# Patient Record
Sex: FEMALE | Race: WHITE | NOT HISPANIC OR LATINO | Employment: UNEMPLOYED | URBAN - METROPOLITAN AREA
[De-identification: names, ages, dates, MRNs, and addresses within clinical notes are randomized per-mention and may not be internally consistent; named-entity substitution may affect disease eponyms.]

---

## 2019-03-27 ENCOUNTER — TELEPHONE (OUTPATIENT)
Dept: RHEUMATOLOGY | Facility: CLINIC | Age: 49
End: 2019-03-27
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
## 2019-10-30 ENCOUNTER — TELEPHONE (OUTPATIENT)
Dept: BARIATRICS | Facility: CLINIC | Age: 49
End: 2019-10-30

## 2019-10-30 NOTE — TELEPHONE ENCOUNTER
Patient stated she would like to see Dr Adan. Was able to schedule her and told her I will mail her out info Address: [address] [place] [address], [place] [address]

## 2019-10-30 NOTE — TELEPHONE ENCOUNTER
----- Message from Maryjoselito Bardales sent at 10/30/2019 12:14 PM CDT -----  Contact: Bola Pts    Needs Medical Advice    Who Called: Bola Scott pt's      Symptoms (please be specific): Bola states that he is calling on behalf of patient to discuss possible revision of previous WLS pt had done w/ Dr. Pandey in 2005/2006. States that pt has gained some weight back and she would like to know how to move forward with having WLS again. Please contact pt/pt's  to advise.    Best Call Back Number: [phone]

## 2019-10-30 NOTE — TELEPHONE ENCOUNTER
----- Message from Maryjoselito Bardales sent at 10/30/2019 12:14 PM CDT -----  Contact: Bola Pts    Needs Medical Advice    Who Called: Bola Scott pt's      Symptoms (please be specific): Bola states that he is calling on behalf of patient to discuss possible revision of previous WLS pt had done w/ Dr. Pandey in 2005/2006. States that pt has gained some weight back and she would like to know how to move forward with having WLS again. Please contact pt/pt's  to advise.    Best Call Back Number: [phone]

## 2020-02-06 ENCOUNTER — HISTORICAL (OUTPATIENT)
Dept: ADMINISTRATIVE | Facility: HOSPITAL | Age: 50
End: 2020-02-06

## 2020-02-07 LAB
LAB AP CLINICAL INFORMATION: NORMAL
LAB AP DIAGNOSIS - HISTORICAL: NORMAL
LAB AP GROSS PATHOLOGY - HISTORICAL: NORMAL
LAB AP SPECIMEN SUBMITTED - HISTORICAL: NORMAL

## 2020-03-05 ENCOUNTER — HISTORICAL (OUTPATIENT)
Dept: ADMINISTRATIVE | Facility: HOSPITAL | Age: 50
End: 2020-03-05

## 2020-03-06 LAB

## 2021-08-04 ENCOUNTER — OFFICE VISIT (OUTPATIENT)
Dept: FAMILY MEDICINE | Facility: CLINIC | Age: 51
End: 2021-08-04
Payer: COMMERCIAL

## 2021-08-04 VITALS
RESPIRATION RATE: 18 BRPM | WEIGHT: 230 LBS | OXYGEN SATURATION: 99 % | HEART RATE: 82 BPM | TEMPERATURE: 98 F | SYSTOLIC BLOOD PRESSURE: 124 MMHG | HEIGHT: 62 IN | DIASTOLIC BLOOD PRESSURE: 80 MMHG | BODY MASS INDEX: 42.33 KG/M2

## 2021-08-04 DIAGNOSIS — L23.2 ALLERGIC CONTACT DERMATITIS DUE TO COSMETICS: Primary | ICD-10-CM

## 2021-08-04 PROBLEM — D64.9 ANEMIA: Status: ACTIVE | Noted: 2021-08-04

## 2021-08-04 PROBLEM — Z78.0 POSTMENOPAUSAL: Status: ACTIVE | Noted: 2021-08-04

## 2021-08-04 PROBLEM — E66.01 MORBID OBESITY: Status: ACTIVE | Noted: 2021-08-04

## 2021-08-04 PROBLEM — M79.7 FIBROMYOSITIS: Status: ACTIVE | Noted: 2021-08-04

## 2021-08-04 PROCEDURE — 1160F RVW MEDS BY RX/DR IN RCRD: CPT | Mod: ,,, | Performed by: NURSE PRACTITIONER

## 2021-08-04 PROCEDURE — 1159F PR MEDICATION LIST DOCUMENTED IN MEDICAL RECORD: ICD-10-PCS | Mod: ,,, | Performed by: NURSE PRACTITIONER

## 2021-08-04 PROCEDURE — 99203 PR OFFICE/OUTPT VISIT, NEW, LEVL III, 30-44 MIN: ICD-10-PCS | Mod: 25,,, | Performed by: NURSE PRACTITIONER

## 2021-08-04 PROCEDURE — 96372 THER/PROPH/DIAG INJ SC/IM: CPT | Mod: ,,, | Performed by: NURSE PRACTITIONER

## 2021-08-04 PROCEDURE — 1160F PR REVIEW ALL MEDS BY PRESCRIBER/CLIN PHARMACIST DOCUMENTED: ICD-10-PCS | Mod: ,,, | Performed by: NURSE PRACTITIONER

## 2021-08-04 PROCEDURE — 3008F BODY MASS INDEX DOCD: CPT | Mod: ,,, | Performed by: NURSE PRACTITIONER

## 2021-08-04 PROCEDURE — 3079F PR MOST RECENT DIASTOLIC BLOOD PRESSURE 80-89 MM HG: ICD-10-PCS | Mod: ,,, | Performed by: NURSE PRACTITIONER

## 2021-08-04 PROCEDURE — 3079F DIAST BP 80-89 MM HG: CPT | Mod: ,,, | Performed by: NURSE PRACTITIONER

## 2021-08-04 PROCEDURE — 1159F MED LIST DOCD IN RCRD: CPT | Mod: ,,, | Performed by: NURSE PRACTITIONER

## 2021-08-04 PROCEDURE — 3074F SYST BP LT 130 MM HG: CPT | Mod: ,,, | Performed by: NURSE PRACTITIONER

## 2021-08-04 PROCEDURE — 3074F PR MOST RECENT SYSTOLIC BLOOD PRESSURE < 130 MM HG: ICD-10-PCS | Mod: ,,, | Performed by: NURSE PRACTITIONER

## 2021-08-04 PROCEDURE — 96372 PR INJECTION,THERAP/PROPH/DIAG2ST, IM OR SUBCUT: ICD-10-PCS | Mod: ,,, | Performed by: NURSE PRACTITIONER

## 2021-08-04 PROCEDURE — 3008F PR BODY MASS INDEX (BMI) DOCUMENTED: ICD-10-PCS | Mod: ,,, | Performed by: NURSE PRACTITIONER

## 2021-08-04 PROCEDURE — 99203 OFFICE O/P NEW LOW 30 MIN: CPT | Mod: 25,,, | Performed by: NURSE PRACTITIONER

## 2021-08-04 RX ORDER — METHYLPREDNISOLONE ACETATE 40 MG/ML
40 INJECTION, SUSPENSION INTRA-ARTICULAR; INTRALESIONAL; INTRAMUSCULAR; SOFT TISSUE ONCE
Status: COMPLETED | OUTPATIENT
Start: 2021-08-04 | End: 2021-08-04

## 2021-08-04 RX ORDER — DEXAMETHASONE SODIUM PHOSPHATE 4 MG/ML
4 INJECTION, SOLUTION INTRA-ARTICULAR; INTRALESIONAL; INTRAMUSCULAR; INTRAVENOUS; SOFT TISSUE ONCE
Status: COMPLETED | OUTPATIENT
Start: 2021-08-04 | End: 2021-08-04

## 2021-08-04 RX ADMIN — METHYLPREDNISOLONE ACETATE 40 MG: 40 INJECTION, SUSPENSION INTRA-ARTICULAR; INTRALESIONAL; INTRAMUSCULAR; SOFT TISSUE at 10:08

## 2021-08-04 RX ADMIN — DEXAMETHASONE SODIUM PHOSPHATE 4 MG: 4 INJECTION, SOLUTION INTRA-ARTICULAR; INTRALESIONAL; INTRAMUSCULAR; INTRAVENOUS; SOFT TISSUE at 10:08

## 2021-10-22 ENCOUNTER — OFFICE VISIT (OUTPATIENT)
Dept: FAMILY MEDICINE | Facility: CLINIC | Age: 51
End: 2021-10-22
Payer: COMMERCIAL

## 2021-10-22 VITALS
RESPIRATION RATE: 17 BRPM | HEART RATE: 91 BPM | HEIGHT: 62 IN | WEIGHT: 227 LBS | TEMPERATURE: 99 F | BODY MASS INDEX: 41.77 KG/M2 | OXYGEN SATURATION: 99 %

## 2021-10-22 DIAGNOSIS — R51.9 ACUTE NONINTRACTABLE HEADACHE, UNSPECIFIED HEADACHE TYPE: ICD-10-CM

## 2021-10-22 DIAGNOSIS — J02.9 SORE THROAT: ICD-10-CM

## 2021-10-22 DIAGNOSIS — R05.9 COUGH: ICD-10-CM

## 2021-10-22 DIAGNOSIS — J01.11 ACUTE RECURRENT FRONTAL SINUSITIS: Primary | ICD-10-CM

## 2021-10-22 LAB
CTP QC/QA: YES
CTP QC/QA: YES
FLUAV AG NPH QL: NEGATIVE
FLUBV AG NPH QL: NEGATIVE
S PYO RRNA THROAT QL PROBE: NEGATIVE
SARS-COV-2 AG RESP QL IA.RAPID: NEGATIVE

## 2021-10-22 PROCEDURE — 1159F MED LIST DOCD IN RCRD: CPT | Mod: ,,, | Performed by: NURSE PRACTITIONER

## 2021-10-22 PROCEDURE — 87880 POCT RAPID STREP A: ICD-10-PCS | Mod: QW,,, | Performed by: NURSE PRACTITIONER

## 2021-10-22 PROCEDURE — 3008F PR BODY MASS INDEX (BMI) DOCUMENTED: ICD-10-PCS | Mod: ,,, | Performed by: NURSE PRACTITIONER

## 2021-10-22 PROCEDURE — 1160F RVW MEDS BY RX/DR IN RCRD: CPT | Mod: ,,, | Performed by: NURSE PRACTITIONER

## 2021-10-22 PROCEDURE — 1159F PR MEDICATION LIST DOCUMENTED IN MEDICAL RECORD: ICD-10-PCS | Mod: ,,, | Performed by: NURSE PRACTITIONER

## 2021-10-22 PROCEDURE — 99214 PR OFFICE/OUTPT VISIT, EST, LEVL IV, 30-39 MIN: ICD-10-PCS | Mod: ,,, | Performed by: NURSE PRACTITIONER

## 2021-10-22 PROCEDURE — 99214 OFFICE O/P EST MOD 30 MIN: CPT | Mod: ,,, | Performed by: NURSE PRACTITIONER

## 2021-10-22 PROCEDURE — 87428 SARSCOV & INF VIR A&B AG IA: CPT | Mod: QW,,, | Performed by: NURSE PRACTITIONER

## 2021-10-22 PROCEDURE — 87880 STREP A ASSAY W/OPTIC: CPT | Mod: QW,,, | Performed by: NURSE PRACTITIONER

## 2021-10-22 PROCEDURE — 1160F PR REVIEW ALL MEDS BY PRESCRIBER/CLIN PHARMACIST DOCUMENTED: ICD-10-PCS | Mod: ,,, | Performed by: NURSE PRACTITIONER

## 2021-10-22 PROCEDURE — 87428 POCT SARS-COV2 (COVID) WITH FLU ANTIGEN: ICD-10-PCS | Mod: QW,,, | Performed by: NURSE PRACTITIONER

## 2021-10-22 PROCEDURE — 3008F BODY MASS INDEX DOCD: CPT | Mod: ,,, | Performed by: NURSE PRACTITIONER

## 2021-10-22 RX ORDER — GUAIFENESIN 1200 MG/1
1 TABLET, EXTENDED RELEASE ORAL 2 TIMES DAILY
Qty: 20 TABLET | Refills: 0 | Status: SHIPPED | OUTPATIENT
Start: 2021-10-22 | End: 2021-11-01

## 2021-10-22 RX ORDER — METHYLPREDNISOLONE 4 MG/1
TABLET ORAL
Qty: 21 TABLET | Refills: 0 | Status: SHIPPED | OUTPATIENT
Start: 2021-10-22 | End: 2022-04-08

## 2022-04-08 ENCOUNTER — OFFICE VISIT (OUTPATIENT)
Dept: FAMILY MEDICINE | Facility: CLINIC | Age: 52
End: 2022-04-08
Payer: COMMERCIAL

## 2022-04-08 VITALS
TEMPERATURE: 99 F | SYSTOLIC BLOOD PRESSURE: 112 MMHG | RESPIRATION RATE: 18 BRPM | HEART RATE: 92 BPM | BODY MASS INDEX: 41.52 KG/M2 | DIASTOLIC BLOOD PRESSURE: 82 MMHG | OXYGEN SATURATION: 95 % | WEIGHT: 227 LBS

## 2022-04-08 DIAGNOSIS — H65.92 MIDDLE EAR EFFUSION, LEFT: Primary | ICD-10-CM

## 2022-04-08 DIAGNOSIS — R42 VERTIGO: ICD-10-CM

## 2022-04-08 PROCEDURE — 3008F PR BODY MASS INDEX (BMI) DOCUMENTED: ICD-10-PCS | Mod: ,,, | Performed by: NURSE PRACTITIONER

## 2022-04-08 PROCEDURE — 99214 PR OFFICE/OUTPT VISIT, EST, LEVL IV, 30-39 MIN: ICD-10-PCS | Mod: ,,, | Performed by: NURSE PRACTITIONER

## 2022-04-08 PROCEDURE — 1159F PR MEDICATION LIST DOCUMENTED IN MEDICAL RECORD: ICD-10-PCS | Mod: ,,, | Performed by: NURSE PRACTITIONER

## 2022-04-08 PROCEDURE — 99214 OFFICE O/P EST MOD 30 MIN: CPT | Mod: ,,, | Performed by: NURSE PRACTITIONER

## 2022-04-08 PROCEDURE — 1160F PR REVIEW ALL MEDS BY PRESCRIBER/CLIN PHARMACIST DOCUMENTED: ICD-10-PCS | Mod: ,,, | Performed by: NURSE PRACTITIONER

## 2022-04-08 PROCEDURE — 3074F PR MOST RECENT SYSTOLIC BLOOD PRESSURE < 130 MM HG: ICD-10-PCS | Mod: ,,, | Performed by: NURSE PRACTITIONER

## 2022-04-08 PROCEDURE — 3008F BODY MASS INDEX DOCD: CPT | Mod: ,,, | Performed by: NURSE PRACTITIONER

## 2022-04-08 PROCEDURE — 3079F DIAST BP 80-89 MM HG: CPT | Mod: ,,, | Performed by: NURSE PRACTITIONER

## 2022-04-08 PROCEDURE — 3079F PR MOST RECENT DIASTOLIC BLOOD PRESSURE 80-89 MM HG: ICD-10-PCS | Mod: ,,, | Performed by: NURSE PRACTITIONER

## 2022-04-08 PROCEDURE — 3074F SYST BP LT 130 MM HG: CPT | Mod: ,,, | Performed by: NURSE PRACTITIONER

## 2022-04-08 PROCEDURE — 1160F RVW MEDS BY RX/DR IN RCRD: CPT | Mod: ,,, | Performed by: NURSE PRACTITIONER

## 2022-04-08 PROCEDURE — 1159F MED LIST DOCD IN RCRD: CPT | Mod: ,,, | Performed by: NURSE PRACTITIONER

## 2022-04-08 RX ORDER — MECLIZINE HYDROCHLORIDE 25 MG/1
25 TABLET ORAL 3 TIMES DAILY PRN
Qty: 45 TABLET | Refills: 0 | Status: SHIPPED | OUTPATIENT
Start: 2022-04-08 | End: 2023-11-08

## 2022-04-08 RX ORDER — CETIRIZINE HYDROCHLORIDE, PSEUDOEPHEDRINE HYDROCHLORIDE 5; 120 MG/1; MG/1
1 TABLET, FILM COATED, EXTENDED RELEASE ORAL NIGHTLY
Qty: 10 TABLET | Refills: 0 | Status: SHIPPED | OUTPATIENT
Start: 2022-04-08 | End: 2022-04-18

## 2022-04-08 NOTE — PROGRESS NOTES
[facility]    Chief Complaint      Chief Complaint   Patient presents with    Ear Fullness     Left ear W/ pain x 1month     Nausea     X 1month     Dizziness     X 1month      History of Present Illness      Nely Scott is a 52 y.o. female with chronic conditions of fibromyalgia and obesity who presents today for c/o left ear pain and nausea intermittently x1 month. She reports that she was cleared by GI for her nausea, who recommended that she follow up with a family practice provider for further evaluation. She also c/o a cute to her finger this AM with a razor that has been bleeding persistently. She reports that it was a new razor and that her tetanus is UTD.    Ear Fullness   There is pain in the left ear. This is a recurrent problem. The current episode started 1 to 4 weeks ago. The problem occurs constantly. The problem has been unchanged. There has been no fever. The patient is experiencing no pain. Associated symptoms include hearing loss. Pertinent negatives include no abdominal pain, coughing, diarrhea, ear discharge, headaches, neck pain, rash, rhinorrhea, sore throat or vomiting. She has tried nothing for the symptoms. There is no history of a chronic ear infection, hearing loss or a tympanostomy tube.     Past Medical History:  Past Medical History:   Diagnosis Date    Anxiety     Fibromyalgia     Headache(784.0)     Loss of balance     Memory loss      Past Surgical History:   has a past surgical history that includes Dilation and curettage of uterus; Hysterectomy; TONSILLECTOMY, ADENOIDECTOMY; and spinal tap.    Social History:  Social History     Tobacco Use    Smoking status: Never Smoker    Smokeless tobacco: Never Used   Substance Use Topics    Alcohol use: No    Drug use: Never     I personally reviewed all past medical, surgical, and social.     Review of Systems   Constitutional: Negative for chills, fever and malaise/fatigue.   HENT: Positive for ear pain  and hearing loss. Negative for congestion, ear discharge, rhinorrhea, sore throat and tinnitus.    Eyes: Negative for discharge and redness.   Respiratory: Negative for cough and shortness of breath.    Cardiovascular: Negative for chest pain, palpitations and leg swelling.   Gastrointestinal: Positive for nausea. Negative for abdominal pain, diarrhea and vomiting.   Genitourinary: Negative for dysuria, frequency and urgency.   Musculoskeletal: Negative for myalgias and neck pain.   Skin: Negative for itching and rash.   Neurological: Negative for dizziness, weakness and headaches.   Endo/Heme/Allergies: Does not bruise/bleed easily.   Psychiatric/Behavioral: Negative for suicidal ideas.      Medications:  Outpatient Encounter Medications as of 4/8/2022   Medication Sig Dispense Refill    methylphenidate (RITALIN) 10 MG tablet Take 10 mg by mouth 3 (three) times daily with meals.      tramadol (ULTRAM) 50 mg tablet Take 1 tablet (50 mg total) by mouth every 6 (six) hours as needed. 120 tablet 2    cetirizine-pseudoephedrine (ZYRTEC-D) 5-120 mg Tb12 Take 1 tablet by mouth every evening. for 10 days 10 tablet 0    cyanocobalamin, vitamin B-12, 1,000 mcg Subl Place under the tongue. Patient not sure of dose, just knows she takes two capsules.      ergocalciferol (ERGOCALCIFEROL) 50,000 unit Cap Take 50,000 Units by mouth every 7 days.      meclizine (ANTIVERT) 25 mg tablet Take 1 tablet (25 mg total) by mouth 3 (three) times daily as needed for Dizziness or Nausea. 45 tablet 0    nortriptyline (PAMELOR) 10 MG capsule Take 2 capsules (20 mg total) by mouth every evening. 30 capsule 2    tizanidine 4 mg Cap Take by mouth. Take 1/4 to one tab po 4 times daily      [DISCONTINUED] gabapentin (NEURONTIN) 800 MG tablet Take 1 tablet (800 mg total) by mouth 3 (three) times daily. Take 1/2 to 1 tablet PT 4 times daily (Patient not taking: Reported on 8/4/2021) 90 tablet 2    [DISCONTINUED] methylPREDNISolone (MEDROL  DOSEPACK) 4 mg tablet use as directed (Patient not taking: Reported on 4/8/2022) 21 tablet 0     No facility-administered encounter medications on file as of 4/8/2022.     Allergies:  Review of patient's allergies indicates:   Allergen Reactions    Codeine Hives, Itching and Rash     Health Maintenance:    There is no immunization history on file for this patient.   Health Maintenance   Topic Date Due    Hepatitis C Screening  Never done    TETANUS VACCINE  Never done    Mammogram  Never done    Lipid Panel  06/30/2010      Physical Exam      Vital Signs  Temp: 98.7 °F (37.1 °C)  Pulse: 92  Resp: 18  SpO2: 95 %  BP: 112/82  Height and Weight  Weight: 103 kg (227 lb)]    Physical Exam  Vitals and nursing note reviewed.   Constitutional:       General: She is not in acute distress.     Appearance: Normal appearance. She is obese.   HENT:      Head: Normocephalic and atraumatic.      Right Ear: External ear normal. No middle ear effusion. Tympanic membrane is not injected, erythematous or bulging.      Left Ear: External ear normal. A middle ear effusion is present. Tympanic membrane is bulging. Tympanic membrane is not injected or erythematous.      Nose: Nose normal.      Mouth/Throat:      Mouth: Mucous membranes are moist.      Pharynx: Oropharynx is clear.   Eyes:      Conjunctiva/sclera: Conjunctivae normal.      Pupils: Pupils are equal, round, and reactive to light.   Cardiovascular:      Rate and Rhythm: Normal rate and regular rhythm.      Pulses: Normal pulses.      Heart sounds: Normal heart sounds. No murmur heard.  Pulmonary:      Effort: Pulmonary effort is normal. No respiratory distress.      Breath sounds: Normal breath sounds.   Musculoskeletal:         General: Normal range of motion.      Cervical back: Normal range of motion and neck supple.   Skin:     General: Skin is warm and dry.   Neurological:      General: No focal deficit present.      Mental Status: She is alert and oriented to  person, place, and time. Mental status is at baseline.   Psychiatric:         Mood and Affect: Mood normal.         Behavior: Behavior normal.         Thought Content: Thought content normal.         Judgment: Judgment normal.        Assessment/Plan     Nley Scott is a 52 y.o.female with:    1. Middle ear effusion, left  - cetirizine-pseudoephedrine (ZYRTEC-D) 5-120 mg Tb12; Take 1 tablet by mouth every evening. for 10 days  Dispense: 10 tablet; Refill: 0    2. Vertigo  - meclizine (ANTIVERT) 25 mg tablet; Take 1 tablet (25 mg total) by mouth 3 (three) times daily as needed for Dizziness or Nausea.  Dispense: 45 tablet; Refill: 0     Chronic conditions status updated as per HPI.  Other than changes above, cont current medications and maintain follow up with specialists.  Return to clinic months.    Farzaneh He, KAREYP  [facility]

## 2022-05-18 ENCOUNTER — OFFICE VISIT (OUTPATIENT)
Dept: FAMILY MEDICINE | Facility: CLINIC | Age: 52
End: 2022-05-18
Payer: COMMERCIAL

## 2022-05-18 VITALS
WEIGHT: 224 LBS | TEMPERATURE: 98 F | BODY MASS INDEX: 41.22 KG/M2 | SYSTOLIC BLOOD PRESSURE: 118 MMHG | DIASTOLIC BLOOD PRESSURE: 76 MMHG | HEIGHT: 62 IN

## 2022-05-18 DIAGNOSIS — Z20.822 CONTACT WITH AND (SUSPECTED) EXPOSURE TO COVID-19: Primary | ICD-10-CM

## 2022-05-18 LAB
CTP QC/QA: YES
FLUAV AG NPH QL: NEGATIVE
FLUBV AG NPH QL: NEGATIVE
SARS-COV-2 AG RESP QL IA.RAPID: NEGATIVE

## 2022-05-18 PROCEDURE — 1160F PR REVIEW ALL MEDS BY PRESCRIBER/CLIN PHARMACIST DOCUMENTED: ICD-10-PCS | Mod: ,,, | Performed by: NURSE PRACTITIONER

## 2022-05-18 PROCEDURE — 87428 POCT SARS-COV2 (COVID) WITH FLU ANTIGEN: ICD-10-PCS | Mod: QW,,, | Performed by: NURSE PRACTITIONER

## 2022-05-18 PROCEDURE — 1159F PR MEDICATION LIST DOCUMENTED IN MEDICAL RECORD: ICD-10-PCS | Mod: ,,, | Performed by: NURSE PRACTITIONER

## 2022-05-18 PROCEDURE — 99213 PR OFFICE/OUTPT VISIT, EST, LEVL III, 20-29 MIN: ICD-10-PCS | Mod: ,,, | Performed by: NURSE PRACTITIONER

## 2022-05-18 PROCEDURE — 3078F PR MOST RECENT DIASTOLIC BLOOD PRESSURE < 80 MM HG: ICD-10-PCS | Mod: ,,, | Performed by: NURSE PRACTITIONER

## 2022-05-18 PROCEDURE — 3074F PR MOST RECENT SYSTOLIC BLOOD PRESSURE < 130 MM HG: ICD-10-PCS | Mod: ,,, | Performed by: NURSE PRACTITIONER

## 2022-05-18 PROCEDURE — 87428 SARSCOV & INF VIR A&B AG IA: CPT | Mod: QW,,, | Performed by: NURSE PRACTITIONER

## 2022-05-18 PROCEDURE — 1160F RVW MEDS BY RX/DR IN RCRD: CPT | Mod: ,,, | Performed by: NURSE PRACTITIONER

## 2022-05-18 PROCEDURE — 3008F PR BODY MASS INDEX (BMI) DOCUMENTED: ICD-10-PCS | Mod: ,,, | Performed by: NURSE PRACTITIONER

## 2022-05-18 PROCEDURE — 3008F BODY MASS INDEX DOCD: CPT | Mod: ,,, | Performed by: NURSE PRACTITIONER

## 2022-05-18 PROCEDURE — 1159F MED LIST DOCD IN RCRD: CPT | Mod: ,,, | Performed by: NURSE PRACTITIONER

## 2022-05-18 PROCEDURE — 3074F SYST BP LT 130 MM HG: CPT | Mod: ,,, | Performed by: NURSE PRACTITIONER

## 2022-05-18 PROCEDURE — 3078F DIAST BP <80 MM HG: CPT | Mod: ,,, | Performed by: NURSE PRACTITIONER

## 2022-05-18 PROCEDURE — 99213 OFFICE O/P EST LOW 20 MIN: CPT | Mod: ,,, | Performed by: NURSE PRACTITIONER

## 2022-05-18 NOTE — LETTER
May 18, 2022    Nely Scott  [address] [place] [address]             [facility]  [address] [facility] [place] [address]  Phone: [phone]  Fax: [phone]   May 18, 2022     Patient: Nely Scott   YOB: 1970   Date of Visit: 5/18/2022       To Whom it May Concern:    Nely Scott was seen in my clinic on 5/18/2022. She may return to work on 5/24/22 and then must wear a mask for 5 additional days outside of the home.    Please excuse her from any classes or work missed.    If you have any questions or concerns, please don't hesitate to call.    Sincerely,         VIVIAN Horvath

## 2022-05-18 NOTE — PROGRESS NOTES
[facility]    Chief Complaint      Chief Complaint   Patient presents with    Chest Congestion     Feels like needs to cough something up but cant    Cough    Nasal Congestion    Sore Throat    Documentation Only     Started this morning; vac    Fatigue       History of Present Illness      Nely Scott is a 52 y.o. female with chronic conditions of Anxiety, Fibromyalgia who presents today for URI type symptoms that began this morning.  Patient reports her  was seen yesterday and tested negative for Covid and Flu but states they have had a positive family contact that tested positive for Covid on Sunday.     Past Medical History:  Past Medical History:   Diagnosis Date    Anxiety     Fibromyalgia     Headache(784.0)     Loss of balance     Memory loss        Past Surgical History:   has a past surgical history that includes Dilation and curettage of uterus; Hysterectomy; TONSILLECTOMY, ADENOIDECTOMY; and spinal tap.    Social History:  Social History     Tobacco Use    Smoking status: Never Smoker    Smokeless tobacco: Never Used   Substance Use Topics    Alcohol use: No    Drug use: Never       I personally reviewed all past medical, surgical, and social.     Review of Systems   Constitutional: Positive for fatigue. Negative for chills and fever.   HENT: Positive for congestion and sore throat. Negative for rhinorrhea and sneezing.    Respiratory: Positive for cough. Negative for shortness of breath.    Cardiovascular: Negative.    Gastrointestinal: Negative for diarrhea, nausea and vomiting.   Musculoskeletal: Negative for myalgias.   Skin: Negative for rash.   Neurological: Negative for headaches.        Medications:  Outpatient Encounter Medications as of 5/18/2022   Medication Sig Dispense Refill    cyanocobalamin, vitamin B-12, 1,000 mcg Subl Place under the tongue. Patient not sure of dose, just knows she takes two capsules.      ergocalciferol (ERGOCALCIFEROL)  "50,000 unit Cap Take 50,000 Units by mouth every 7 days.      tramadol (ULTRAM) 50 mg tablet Take 1 tablet (50 mg total) by mouth every 6 (six) hours as needed. 120 tablet 2    meclizine (ANTIVERT) 25 mg tablet Take 1 tablet (25 mg total) by mouth 3 (three) times daily as needed for Dizziness or Nausea. 45 tablet 0    methylphenidate (RITALIN) 10 MG tablet Take 10 mg by mouth 3 (three) times daily with meals.      nortriptyline (PAMELOR) 10 MG capsule Take 2 capsules (20 mg total) by mouth every evening. 30 capsule 2    tizanidine 4 mg Cap Take by mouth. Take 1/4 to one tab po 4 times daily       No facility-administered encounter medications on file as of 5/18/2022.       Allergies:  Review of patient's allergies indicates:   Allergen Reactions    Codeine Hives, Itching and Rash       Health Maintenance:    There is no immunization history on file for this patient.   Health Maintenance   Topic Date Due    Hepatitis C Screening  Never done    TETANUS VACCINE  Never done    Mammogram  Never done    Lipid Panel  06/30/2010        Physical Exam      Vital Signs  Temp: 98.2 °F (36.8 °C)  Temp src: Oral  BP: 118/76  BP Location: Left arm  Patient Position: Sitting  Height and Weight  Height: 5' 2" (157.5 cm)  Weight: 101.6 kg (224 lb)  BSA (Calculated - sq m): 2.11 sq meters  BMI (Calculated): 41  Weight in (lb) to have BMI = 25: 136.4]    Physical Exam  Vitals and nursing note reviewed.   Constitutional:       General: She is not in acute distress.     Appearance: Normal appearance. She is well-developed. She is obese.   HENT:      Head: Normocephalic.      Right Ear: Hearing normal.      Left Ear: Hearing normal.      Nose: Congestion present.   Eyes:      General: Lids are normal.      Extraocular Movements: Extraocular movements intact.      Conjunctiva/sclera: Conjunctivae normal.      Pupils: Pupils are equal, round, and reactive to light.   Cardiovascular:      Rate and Rhythm: Normal rate.   Pulmonary: "      Effort: Pulmonary effort is normal. No respiratory distress.   Musculoskeletal:         General: Normal range of motion.      Cervical back: Normal range of motion and neck supple.   Skin:     General: Skin is warm and dry.   Neurological:      Mental Status: She is alert and oriented to person, place, and time.      Gait: Gait is intact.   Psychiatric:         Behavior: Behavior is cooperative.          Laboratory:  CBC:      CMP:        Invalid input(s): CREATININ  LIPIDS:      TSH:      A1C:        Assessment/Plan     Nely Scott is a 52 y.o.female with:     1. Contact with and (suspected) exposure to covid-19  - POCT SARS-COV2 (COVID) with Flu Antigen     Patient has known exposure to Covid; Daughter also tested positive today  Advised patient to quarantine starting today with daughter per CDC recommendations  Recommended taking Zyrtec OTC and treating other symptoms with OTC meds as needed        Total time spent face-to-face and non-face-to-face coordinating care for this encounter was: 20 min    Chronic conditions status updated as per HPI.  Other than changes above, cont current medications and maintain follow up with specialists.  Return to clinic as needed.    Janki Mccann, KAREYP  [facility]

## 2022-10-12 ENCOUNTER — TELEPHONE (OUTPATIENT)
Dept: RHEUMATOLOGY | Facility: CLINIC | Age: 52
End: 2022-10-12
Payer: COMMERCIAL

## 2022-10-12 NOTE — TELEPHONE ENCOUNTER
S/w pt's  and informed him Dr. Lagos is not accepting new patients at this time and was given the phone number for our [place] office.    Julianna Razo MA  10/12/2022

## 2022-10-12 NOTE — TELEPHONE ENCOUNTER
----- Message from Joon Plaza sent at 10/12/2022 10:17 AM CDT -----  Type:  Sooner Appointment Request    Caller is requesting a sooner appointment.  Caller declined first available appointment listed below.  Caller will not accept being placed on the waitlist and is requesting a message be sent to doctor.    Name of Caller:  patient's   When is the first available appointment?  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 Lagos and they are looking to schedule her an appt.

## 2022-12-08 ENCOUNTER — OFFICE VISIT (OUTPATIENT)
Dept: RHEUMATOLOGY | Facility: CLINIC | Age: 52
End: 2022-12-08
Payer: COMMERCIAL

## 2022-12-08 VITALS
DIASTOLIC BLOOD PRESSURE: 82 MMHG | WEIGHT: 228 LBS | OXYGEN SATURATION: 96 % | SYSTOLIC BLOOD PRESSURE: 126 MMHG | HEART RATE: 76 BPM | BODY MASS INDEX: 41.7 KG/M2 | RESPIRATION RATE: 16 BRPM

## 2022-12-08 DIAGNOSIS — G25.81 RESTLESS LEG SYNDROME: Primary | ICD-10-CM

## 2022-12-08 DIAGNOSIS — Z78.0 MENOPAUSE: ICD-10-CM

## 2022-12-08 PROCEDURE — 3008F BODY MASS INDEX DOCD: CPT | Mod: ,,, | Performed by: INTERNAL MEDICINE

## 2022-12-08 PROCEDURE — 3079F DIAST BP 80-89 MM HG: CPT | Mod: ,,, | Performed by: INTERNAL MEDICINE

## 2022-12-08 PROCEDURE — 99205 PR OFFICE/OUTPT VISIT, NEW, LEVL V, 60-74 MIN: ICD-10-PCS | Mod: S$PBB,,, | Performed by: INTERNAL MEDICINE

## 2022-12-08 PROCEDURE — 99205 OFFICE O/P NEW HI 60 MIN: CPT | Mod: S$PBB,,, | Performed by: INTERNAL MEDICINE

## 2022-12-08 PROCEDURE — 99214 OFFICE O/P EST MOD 30 MIN: CPT | Mod: PBBFAC | Performed by: INTERNAL MEDICINE

## 2022-12-08 PROCEDURE — 1160F RVW MEDS BY RX/DR IN RCRD: CPT | Mod: ,,, | Performed by: INTERNAL MEDICINE

## 2022-12-08 PROCEDURE — 3079F PR MOST RECENT DIASTOLIC BLOOD PRESSURE 80-89 MM HG: ICD-10-PCS | Mod: ,,, | Performed by: INTERNAL MEDICINE

## 2022-12-08 PROCEDURE — 3074F SYST BP LT 130 MM HG: CPT | Mod: ,,, | Performed by: INTERNAL MEDICINE

## 2022-12-08 PROCEDURE — 1159F PR MEDICATION LIST DOCUMENTED IN MEDICAL RECORD: ICD-10-PCS | Mod: ,,, | Performed by: INTERNAL MEDICINE

## 2022-12-08 PROCEDURE — 1159F MED LIST DOCD IN RCRD: CPT | Mod: ,,, | Performed by: INTERNAL MEDICINE

## 2022-12-08 PROCEDURE — 3008F PR BODY MASS INDEX (BMI) DOCUMENTED: ICD-10-PCS | Mod: ,,, | Performed by: INTERNAL MEDICINE

## 2022-12-08 PROCEDURE — 1160F PR REVIEW ALL MEDS BY PRESCRIBER/CLIN PHARMACIST DOCUMENTED: ICD-10-PCS | Mod: ,,, | Performed by: INTERNAL MEDICINE

## 2022-12-08 PROCEDURE — 3074F PR MOST RECENT SYSTOLIC BLOOD PRESSURE < 130 MM HG: ICD-10-PCS | Mod: ,,, | Performed by: INTERNAL MEDICINE

## 2022-12-08 RX ORDER — DULOXETIN HYDROCHLORIDE 30 MG/1
30 CAPSULE, DELAYED RELEASE ORAL
COMMUNITY
Start: 2022-11-16 | End: 2023-11-08

## 2022-12-08 RX ORDER — ACETAMINOPHEN 500 MG
5000 TABLET ORAL
COMMUNITY

## 2022-12-08 RX ORDER — FERROUS SULFATE/VIT C/FOLIC AC 105-500-.8
TABLET, EXTENDED RELEASE ORAL
COMMUNITY
End: 2023-11-08

## 2022-12-08 RX ORDER — LEVONORGESTREL AND ETHINYL ESTRADIOL 0.15-0.03
1 KIT ORAL DAILY
Qty: 30 TABLET | Refills: 11 | Status: SHIPPED | OUTPATIENT
Start: 2022-12-08 | End: 2023-11-08

## 2022-12-08 NOTE — PROGRESS NOTES
Kelly Bucio MD   [facility]  [address] [facility] [place] [address]  [phone]      PATIENT NAME: Nely Scott  : 1970  DATE: 22  MRN: [id number]      Billing Provider: Kelly Bucio MD  Level of Service:   Patient PCP Information       Provider PCP Type    Primary Doctor No General            Reason for Visit / Chief Complaint: Arthritis (C/o arthritis and fibromyalgia/Pt states she has been seeing rheum in [place] but she has retired./C/o joint pain all over and in muscles)           Assessment and Plan (including Health Maintenance)      Problem List  Smart Sets  Document Outside HM   :    Plan:     Nely Scott is a very pleasant 52-year-old woman who is accompanied by her very supportive  today.  It appears Nely has been suffering with global fatigue and arthralgia and myofascial pain for almost 15 years.  She was very attached to her prior rheumatologist who has retired.  I believe she was diagnosed with fibromyalgia.  I had a lengthy discussion with her and in summary I suspect that at least for the last year the worsening in her overall level of energy and myofascial pain level might be related to menopause or perimenopausal state.  She is been experiencing hot flashes for the last year which disturbs her sleep and vaginal dryness.  The couple hopes for improvement in sexual function as well.  She wants to get off Cymbalta and I agree she can taper it off herself.  She has been on it only for a month now but previously has tried it on and off for many years.  I am recommending that she sees an OBGYN to get on HRT.  I am okay starting her on conjugated estrogens today.  She has had hysterectomy.  Should she start feeling better after HRT she may need nothing more than this.  However HRT should be managed by her OBGYN and I will let her make changes.  We discussed role of SSRIs and premenstrual dysmorphic  disorder and as an alternative to Cymbalta showed HRT not be effective.  I have specifically advised her to avoid bioidentical hormone pellet treatment and troch treatment.  I am not opposed to testosterone gel should she wish to improve her sexual function but she can speak with her hormonal therapist about this.  Sometimes testosterone will help with arthritis and in some women especially those with pellet interrupt therapy may actually worsen arthritis.      ICD-10-CM ICD-9-CM    1. Restless leg syndrome  G25.81 333.94 Ambulatory referral/consult to Sleep Disorders      levonorgestrel-ethinyl estradiol (SEASONALE) 0.15 mg-30 mcg (91) per tablet      2. Menopause  Z78.0 627.2 conjugated estrogens (PREMARIN) vaginal cream           Restless leg syndrome  -     Ambulatory referral/consult to Sleep Disorders; Future; Expected date: 12/15/2022  -     levonorgestrel-ethinyl estradiol (SEASONALE) 0.15 mg-30 mcg (91) per tablet; Take 1 tablet by mouth once daily.  Dispense: 30 tablet; Refill: 11    Menopause  -     conjugated estrogens (PREMARIN) vaginal cream; Place 0.5 g vaginally once daily.  Dispense: 1 applicator; Refill: 5           Total time spent in Assessment, Co-ordination of Care , Review of Care Plan , Goal Setting and Counseling on this initial visit is ~ 60 minutes     There is currently no information documented on the homunculus. Go to the Rheumatology activity and complete the homunculus joint exam.               History of Present Illness / Problem Focused Workflow     Nely Scott presents to the clinic with Arthritis (C/o arthritis and fibromyalgia/Pt states she has been seeing rheum in [place] but she has retired./C/o joint pain all over and in muscles)     Was alternating between cymbalta and lyrica with prior rheumatologist for some years.   Tramadol was also managed by prior rheumatologist.   Pain clinic locally placed her on tramadol ER which was not as effective.   Rheumatologist  had her on 200mg of tramadol which made her drowsy but she was able to extend it throughout the day and it seemed to work better.   Pain is global. Does not like cymbalta but is taking it for last month due to lack of choices. Feels like a 'xombie'   Has h/o narcolepsy dx but inadequately treated. May need to go for repeat study.         Review of Systems     Review of Systems   Constitutional:  Negative for fever and unexpected weight change.   HENT:  Negative for mouth sores and trouble swallowing.    Eyes:  Negative for redness.   Respiratory:  Positive for shortness of breath. Negative for cough.    Cardiovascular:  Positive for chest pain.   Gastrointestinal:  Positive for diarrhea. Negative for constipation.   Genitourinary:  Negative for dysuria and genital sores.   Skin:  Negative for rash.   Neurological:  Positive for headaches.   Hematological:  Bruises/bleeds easily.     Medical / Social / Family History     Past Medical History:   Diagnosis Date    Anxiety     Fibromyalgia     Headache(784.0)     Loss of balance     Memory loss        Past Surgical History:   Procedure Laterality Date    DILATION AND CURETTAGE OF UTERUS      GASTRIC BYPASS  2005    HERNIA REPAIR      HYSTERECTOMY      spinal tap      TONSILLECTOMY, ADENOIDECTOMY         Social History  Ms. Nely Scott  reports that she has never smoked. She has never been exposed to tobacco smoke. She has never used smokeless tobacco. She reports that she does not drink alcohol and does not use drugs.    Family History  Ms.'s Nely Scott family history includes Cancer in her maternal grandmother; Diabetes Mellitus in her mother; Heart disease in her maternal grandmother; Hypertension in her mother; Osteoarthritis in her maternal grandmother; Thyroid disease in her maternal grandmother.    Medications and Allergies     Medications  Outpatient Medications Marked as Taking for the 12/8/22 encounter (Office Visit) with  Kelly Bucio MD   Medication Sig Dispense Refill    cholecalciferol, vitamin D3, 125 mcg (5,000 unit) Tab Take 5,000 Units by mouth.      cyanocobalamin, vitamin B-12, 1,000 mcg Subl Place under the tongue. Patient not sure of dose, just knows she takes two capsules.      DULoxetine (CYMBALTA) 30 MG capsule Take 30 mg by mouth.      ferrous sulfate-C-folic acid (FOLITAB) 105 mg iron- 500 mg-800 mcg TbSR Take by mouth.      meclizine (ANTIVERT) 25 mg tablet Take 1 tablet (25 mg total) by mouth 3 (three) times daily as needed for Dizziness or Nausea. 45 tablet 0    multivitamin with minerals tablet Take 1 tablet by mouth once daily.      tramadol (ULTRAM) 50 mg tablet Take 1 tablet (50 mg total) by mouth every 6 (six) hours as needed. 120 tablet 2    [DISCONTINUED] ergocalciferol (ERGOCALCIFEROL) 50,000 unit Cap Take 50,000 Units by mouth every 7 days.         Allergies  Review of patient's allergies indicates:   Allergen Reactions    Codeine Hives, Itching and Rash       Physical Examination     Vitals:    12/08/22 1552   BP: 126/82   Pulse: 76   Resp: 16     Physical Exam  Constitutional:       Appearance: She is obese.   HENT:      Head: Normocephalic and atraumatic.      Right Ear: External ear normal.      Left Ear: External ear normal.      Nose: Nose normal. No congestion or rhinorrhea.   Eyes:      Extraocular Movements: Extraocular movements intact.      Pupils: Pupils are equal, round, and reactive to light.   Cardiovascular:      Pulses: Normal pulses.   Pulmonary:      Effort: Pulmonary effort is normal.      Breath sounds: Normal breath sounds. No wheezing.   Chest:      Chest wall: No tenderness.   Musculoskeletal:         General: Normal range of motion.      Cervical back: No rigidity or tenderness.   Lymphadenopathy:      Cervical: No cervical adenopathy.   Skin:     Findings: No rash.   Neurological:      General: No focal deficit present.      Mental Status: She is alert and oriented  to person, place, and time.   Psychiatric:         Mood and Affect: Mood normal.         Thought Content: Thought content normal.              Signature:  Kelly Bucio MD  [facility]  [address] [place] [id number]  [phone]    Date of encounter: 12/8/22

## 2022-12-12 DIAGNOSIS — G25.81 RESTLESS LEG SYNDROME: Primary | ICD-10-CM

## 2023-02-09 DIAGNOSIS — G47.10 HYPERSOMNIA, UNSPECIFIED: ICD-10-CM

## 2023-02-09 DIAGNOSIS — G47.30 SLEEP APNEA, UNSPECIFIED: Primary | ICD-10-CM

## 2023-02-15 ENCOUNTER — PROCEDURE VISIT (OUTPATIENT)
Dept: SLEEP MEDICINE | Facility: HOSPITAL | Age: 53
End: 2023-02-15
Attending: INTERNAL MEDICINE
Payer: COMMERCIAL

## 2023-02-15 DIAGNOSIS — G47.30 SLEEP APNEA, UNSPECIFIED: ICD-10-CM

## 2023-02-15 DIAGNOSIS — G47.10 HYPERSOMNIA, UNSPECIFIED: ICD-10-CM

## 2023-02-15 PROCEDURE — G0399 HOME SLEEP TEST/TYPE 3 PORTA: HCPCS | Mod: PO

## 2023-02-16 NOTE — PROCEDURES
Procedures  Home sleep summary included in technical report. Has been uploaded to [employer];  Recording from 4666 until 5407.     
3-point gait/postural sway, some retropulsion noted, unsteady and declined. Shuffles and NBOS

## 2023-03-27 ENCOUNTER — TELEPHONE (OUTPATIENT)
Dept: SLEEP MEDICINE | Facility: HOSPITAL | Age: 53
End: 2023-03-27
Payer: COMMERCIAL

## 2023-05-01 DIAGNOSIS — G47.30 SLEEP APNEA, UNSPECIFIED: Primary | ICD-10-CM

## 2023-05-01 DIAGNOSIS — G47.10 HYPERSOMNIA, UNSPECIFIED: ICD-10-CM

## 2023-05-10 DIAGNOSIS — G47.10 HYPERSOMNIA, UNSPECIFIED: ICD-10-CM

## 2023-05-10 DIAGNOSIS — G47.30 SLEEP APNEA, UNSPECIFIED: Primary | ICD-10-CM

## 2023-06-21 ENCOUNTER — PROCEDURE VISIT (OUTPATIENT)
Dept: SLEEP MEDICINE | Facility: HOSPITAL | Age: 53
End: 2023-06-21
Payer: COMMERCIAL

## 2023-06-21 DIAGNOSIS — G47.10 HYPERSOMNIA, UNSPECIFIED: ICD-10-CM

## 2023-06-21 DIAGNOSIS — G47.30 SLEEP APNEA, UNSPECIFIED: ICD-10-CM

## 2023-06-21 PROCEDURE — 95810 POLYSOM 6/> YRS 4/> PARAM: CPT | Mod: PO

## 2023-11-08 ENCOUNTER — OFFICE VISIT (OUTPATIENT)
Dept: FAMILY MEDICINE | Facility: CLINIC | Age: 53
End: 2023-11-08
Payer: COMMERCIAL

## 2023-11-08 VITALS
BODY MASS INDEX: 46.72 KG/M2 | HEART RATE: 91 BPM | WEIGHT: 238 LBS | DIASTOLIC BLOOD PRESSURE: 83 MMHG | RESPIRATION RATE: 17 BRPM | SYSTOLIC BLOOD PRESSURE: 130 MMHG | OXYGEN SATURATION: 96 % | HEIGHT: 60 IN

## 2023-11-08 DIAGNOSIS — M51.9 LUMBOSACRAL DISC DISEASE: ICD-10-CM

## 2023-11-08 DIAGNOSIS — N22 CALCULUS OF URINARY TRACT IN DISEASES CLASSIFIED ELSEWHERE: ICD-10-CM

## 2023-11-08 DIAGNOSIS — M54.42 ACUTE LEFT-SIDED LOW BACK PAIN WITH LEFT-SIDED SCIATICA: ICD-10-CM

## 2023-11-08 DIAGNOSIS — N20.0 LEFT RENAL STONE: ICD-10-CM

## 2023-11-08 DIAGNOSIS — M19.91 PRIMARY OSTEOARTHRITIS, UNSPECIFIED SITE: Primary | ICD-10-CM

## 2023-11-08 DIAGNOSIS — M79.7 FIBROMYALGIA: ICD-10-CM

## 2023-11-08 PROBLEM — M19.90 OSTEOARTHRITIS: Status: ACTIVE | Noted: 2023-01-31

## 2023-11-08 PROBLEM — D50.9 IRON DEFICIENCY ANEMIA: Status: ACTIVE | Noted: 2023-01-31

## 2023-11-08 PROBLEM — Z90.3 POSTGASTRECTOMY MALABSORPTION: Status: ACTIVE | Noted: 2023-01-31

## 2023-11-08 PROBLEM — E55.9 VITAMIN D DEFICIENCY, UNSPECIFIED: Status: ACTIVE | Noted: 2023-01-31

## 2023-11-08 PROBLEM — K91.2 POSTGASTRECTOMY MALABSORPTION: Status: ACTIVE | Noted: 2023-01-31

## 2023-11-08 PROCEDURE — 3075F PR MOST RECENT SYSTOLIC BLOOD PRESS GE 130-139MM HG: ICD-10-PCS | Mod: ,,, | Performed by: NURSE PRACTITIONER

## 2023-11-08 PROCEDURE — 1159F MED LIST DOCD IN RCRD: CPT | Mod: ,,, | Performed by: NURSE PRACTITIONER

## 2023-11-08 PROCEDURE — 96372 PR INJECTION,THERAP/PROPH/DIAG2ST, IM OR SUBCUT: ICD-10-PCS | Mod: ,,, | Performed by: NURSE PRACTITIONER

## 2023-11-08 PROCEDURE — 96372 THER/PROPH/DIAG INJ SC/IM: CPT | Mod: ,,, | Performed by: NURSE PRACTITIONER

## 2023-11-08 PROCEDURE — 99214 PR OFFICE/OUTPT VISIT, EST, LEVL IV, 30-39 MIN: ICD-10-PCS | Mod: 25,,, | Performed by: NURSE PRACTITIONER

## 2023-11-08 PROCEDURE — 99214 OFFICE O/P EST MOD 30 MIN: CPT | Mod: 25,,, | Performed by: NURSE PRACTITIONER

## 2023-11-08 PROCEDURE — 1159F PR MEDICATION LIST DOCUMENTED IN MEDICAL RECORD: ICD-10-PCS | Mod: ,,, | Performed by: NURSE PRACTITIONER

## 2023-11-08 PROCEDURE — 3079F PR MOST RECENT DIASTOLIC BLOOD PRESSURE 80-89 MM HG: ICD-10-PCS | Mod: ,,, | Performed by: NURSE PRACTITIONER

## 2023-11-08 PROCEDURE — 3008F PR BODY MASS INDEX (BMI) DOCUMENTED: ICD-10-PCS | Mod: ,,, | Performed by: NURSE PRACTITIONER

## 2023-11-08 PROCEDURE — 3008F BODY MASS INDEX DOCD: CPT | Mod: ,,, | Performed by: NURSE PRACTITIONER

## 2023-11-08 PROCEDURE — 3079F DIAST BP 80-89 MM HG: CPT | Mod: ,,, | Performed by: NURSE PRACTITIONER

## 2023-11-08 PROCEDURE — 3075F SYST BP GE 130 - 139MM HG: CPT | Mod: ,,, | Performed by: NURSE PRACTITIONER

## 2023-11-08 RX ORDER — CYANOCOBALAMIN (VITAMIN B-12) 2500 MCG
TABLET, SUBLINGUAL SUBLINGUAL
COMMUNITY
End: 2023-11-08

## 2023-11-08 RX ORDER — METHOCARBAMOL 500 MG/1
500 TABLET, FILM COATED ORAL 4 TIMES DAILY PRN
Qty: 40 TABLET | Refills: 0 | Status: SHIPPED | OUTPATIENT
Start: 2023-11-08 | End: 2023-11-18

## 2023-11-08 RX ORDER — DEXAMETHASONE SODIUM PHOSPHATE 4 MG/ML
4 INJECTION, SOLUTION INTRA-ARTICULAR; INTRALESIONAL; INTRAMUSCULAR; INTRAVENOUS; SOFT TISSUE ONCE
Status: COMPLETED | OUTPATIENT
Start: 2023-11-08 | End: 2023-11-08

## 2023-11-08 RX ORDER — KETOROLAC TROMETHAMINE 10 MG/1
10 TABLET, FILM COATED ORAL EVERY 6 HOURS PRN
Qty: 20 TABLET | Refills: 0 | Status: SHIPPED | OUTPATIENT
Start: 2023-11-08 | End: 2023-11-13

## 2023-11-08 RX ORDER — KETOROLAC TROMETHAMINE 30 MG/ML
60 INJECTION, SOLUTION INTRAMUSCULAR; INTRAVENOUS
Status: COMPLETED | OUTPATIENT
Start: 2023-11-08 | End: 2023-11-08

## 2023-11-08 RX ADMIN — KETOROLAC TROMETHAMINE 60 MG: 30 INJECTION, SOLUTION INTRAMUSCULAR; INTRAVENOUS at 03:11

## 2023-11-08 RX ADMIN — DEXAMETHASONE SODIUM PHOSPHATE 4 MG: 4 INJECTION, SOLUTION INTRA-ARTICULAR; INTRALESIONAL; INTRAMUSCULAR; INTRAVENOUS; SOFT TISSUE at 03:11

## 2023-11-08 NOTE — PROGRESS NOTES
Subjective:       Patient ID: Nely Scott is a 53 y.o. female.    Vitals:  height is 5' (1.524 m) and weight is 108 kg (238 lb). Her blood pressure is 130/83 and her pulse is 91. Her respiration is 17 and oxygen saturation is 96%.     Chief Complaint: Back Pain (Lower back pain that radiates to left leg, has a Hx of Sciac, with no relief from OTC medications and regimens )    Back Pain  This is a new problem. The current episode started in the past 7 days (10 days). The problem occurs constantly. The problem has been gradually worsening since onset. The pain is present in the lumbar spine and sacro-iliac. The quality of the pain is described as stabbing and shooting. The pain radiates to the left foot. The pain is at a severity of 8/10. The pain is severe. The pain is Worse during the day. The symptoms are aggravated by bending. Stiffness is present: with prolonged positioning. Pertinent negatives include no dysuria, headaches, leg pain, numbness, paresthesias, tingling or weakness. Risk factors include obesity. She has tried NSAIDs and heat (topical biofreeze) for the symptoms. The treatment provided mild relief.       Genitourinary:  Negative for dysuria, frequency, urgency, hematuria and history of kidney stones.   Musculoskeletal:  Positive for back pain.   Neurological:  Negative for headaches and numbness.         Objective:      Physical Exam  Vitals reviewed.   Constitutional:       Appearance: Normal appearance.   Cardiovascular:      Rate and Rhythm: Normal rate and regular rhythm.   Pulmonary:      Effort: Pulmonary effort is normal.      Breath sounds: Normal breath sounds.   Neurological:      Mental Status: She is alert and oriented to person, place, and time.   Psychiatric:         Mood and Affect: Mood normal.         Behavior: Behavior normal.         Thought Content: Thought content normal.         Judgment: Judgment normal.              Assessment:       1. Primary osteoarthritis,  unspecified site    2. Acute left-sided low back pain with left-sided sciatica    3. Fibromyalgia    4. Left renal stone    5. Calculus of urinary tract in diseases classified elsewhere    6. Lumbosacral disc disease        X-Ray Lumbar Spine AP And Lateral  Narrative: EXAMINATION:  XR LUMBAR SPINE AP AND LATERAL    CLINICAL HISTORY:  Low back pain, unspecified    TECHNIQUE:  Lumbar spine, AP lateral and cone-down views    COMPARISON:  CT scan of the abdomen dated 05/26/2006    FINDINGS:  Mild degenerative changes are present at L5-S1 and mild degenerative facet changes are seen from L4 through S1.  No subluxation is seen.  SI joints are normal.    There is an 8 mm calcification which overlies the expected area of the left kidney in this patient with known nephrolithiasis.  Th surgical sutures are noted in the left upper quadrant.  Impression: 1. Mild degenerative changes are present as described from L4 through S1.    2.  Left nephrolithiasis    Place of service: [facility]    Electronically signed by: Sheila Cotter  Date:    11/08/2023  Time:    17:14     Plan:         Primary osteoarthritis, unspecified site  -     Ambulatory referral/consult to Physical/Occupational Therapy; Future; Expected date: 11/15/2023    Acute left-sided low back pain with left-sided sciatica  -     X-Ray Lumbar Spine AP And Lateral; Future; Expected date: 11/08/2023  -     Ambulatory referral/consult to Physical/Occupational Therapy; Future; Expected date: 11/15/2023  -     ketorolac injection 60 mg  -     dexAMETHasone injection 4 mg  -     methocarbamoL (ROBAXIN) 500 MG Tab; Take 1 tablet (500 mg total) by mouth 4 (four) times daily as needed.  Dispense: 40 tablet; Refill: 0  -     ketorolac (TORADOL) 10 mg tablet; Take 1 tablet (10 mg total) by mouth every 6 (six) hours as needed for Pain.  Dispense: 20 tablet; Refill: 0    Fibromyalgia  -     Ambulatory referral/consult to Physical/Occupational Therapy; Future; Expected  date: 11/15/2023    Left renal stone  -     CT Renal Stone Study ABD Pelvis WO; Future; Expected date: 11/08/2023    Calculus of urinary tract in diseases classified elsewhere  -     CT Renal Stone Study ABD Pelvis WO; Future; Expected date: 11/08/2023    Lumbosacral disc disease      Follow up if symptoms worsen or fail to improve.     No future appointments.       An After Visit Summary was printed and given to the patient.     Signature:    VIVIAN Newberry  Family Nurse Practitioner  [facility]    Date of encounter: 11/8/23

## 2023-11-21 ENCOUNTER — TELEPHONE (OUTPATIENT)
Dept: FAMILY MEDICINE | Facility: CLINIC | Age: 53
End: 2023-11-21
Payer: COMMERCIAL

## 2023-11-27 ENCOUNTER — CLINICAL SUPPORT (OUTPATIENT)
Dept: REHABILITATION | Facility: HOSPITAL | Age: 53
End: 2023-11-27
Payer: COMMERCIAL

## 2023-11-27 DIAGNOSIS — M19.91 PRIMARY OSTEOARTHRITIS, UNSPECIFIED SITE: ICD-10-CM

## 2023-11-27 DIAGNOSIS — M54.42 ACUTE LEFT-SIDED LOW BACK PAIN WITH LEFT-SIDED SCIATICA: ICD-10-CM

## 2023-11-27 DIAGNOSIS — M79.7 FIBROMYALGIA: ICD-10-CM

## 2023-11-27 PROCEDURE — 97161 PT EVAL LOW COMPLEX 20 MIN: CPT

## 2023-11-27 NOTE — PLAN OF CARE
[facility]  Physical Therapy Initial Evaluation    Date: 11/27/2023   Name: Nely Scott  Clinic Number: [id number]    Therapy Diagnosis:   Encounter Diagnoses   Name Primary?    Acute left-sided low back pain with left-sided sciatica     Fibromyalgia     Primary osteoarthritis, unspecified site      Physician: Farzaneh He FNP    Physician Orders: PT Eval and Treat   Medical Diagnosis from Referral: Lumbar   Evaluation Date: 11/27/2023  Authorization Period Expiration: 11/7/24  Plan of Care Expiration: 2/19/24  Visit # / Visits authorized: 1/ 60    Time In: 110  Time Out: 210  Total Appointment Time (timed & untimed codes): 60 minutes    Precautions: Standard/ Falls    Subjective   Date of onset: ~ 1 month ago    History of current condition - Nely reports: Left sciatica beginning ~ 1 month ago. Right handed. Constant left low back dull ache pain that feels like a stabbing in the left buttock and shoots down the back of the left leg. Prolonged standing causes numbness along the left lateral thigh. Left leg will buckle with no warning but patient does state she has a prior knee problem from [place]. Difficulty with lifting leg into the truck and painful to drive secondary to bumps in the road. Prolonged sitting/standing/walking makes pain worse. Difficulty with sleeping through the night secondary to pain. Relief with ibuprofen and muscle relaxer and heat. Hx of fibromyalgia. Unsure of next MD visit.   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    Medications:   Nely has a current medication  list which includes the following prescription(s): cholecalciferol (vitamin d3) and multivitamin with minerals.    Allergies:   Review of patient's allergies indicates:   Allergen Reactions    Codeine Hives, Itching and Rash        Imaging, bone scan films: COMPARISON:  CT scan of the abdomen dated 05/26/2006  FINDINGS:  Mild degenerative changes are present at L5-S1 and mild degenerative facet changes are seen from L4 through S1.  No subluxation is seen.  SI joints are normal.  There is an 8 mm calcification which overlies the expected area of the left kidney in this patient with known nephrolithiasis.  Th surgical sutures are noted in the left upper quadrant.  Impression:  1. Mild degenerative changes are present as described from L4 through S1.  2.  Left nephrolithiasis    Pain:  Current 7/10, worst 10/10,  Location: left back , lower legs, and upper legs   Description: Aching, Dull, Burning, Grabbing, and Sharp  Aggravating Factors: Sitting, Standing, Bending, Walking, Night Time, Extension, Flexing, Lifting, and Getting out of bed/chair  Easing Factors: heating pad, nothing, and rest        Objective     Left single leg balance 3 seconds  (+) left sitting slump test with pain in the left buttock/back  Manual muscle test right hip flexion 4/5, left hip flexion 4-/5 with pain in the low back  Manual muscle test right hamstring 4/5, left hamstring 4-/5  Manual muscle test right quad 4+/5, left quad 4/5  Bilateral light touch intact for lower extremities   (+) left straight leg raise test with pain in the left buttock/back  Difficulty with bed mobility and grimaced/painful with all active movement   Difficulty with bridging       Limitation/Restriction for FOTO Survey    Therapist reviewed FOTO scores for Nely Scott on 11/27/2023.   FOTO documents entered into [employer] - see Media section.    Intake Score: 34%       Nely received the following supervised modalities after being cleared for  contradictions: Mechanical Traction:  Nely received intermittent mechanical traction to the lumbar spine at a force of 48 pounds for a total of 15 minutes. Hold time of 45 seconds and rest time for 15 seconds. Patient tolerated treatment well without any adverse effects.      Home Exercises and Patient Education Provided    Education provided:   Eval Findings  - Patient educated on biomechanical justification for therapeutic exercise and importance of compliance with HEP in order to improve overall impairments and QOL   Patient was educated on all the above exercise prior/during/after for proper posture, positioning, and execution for safe performance with home exercise program.   Patient educated on the importance of improved core and upper and lower extremity strength in order to improve alignment of the spine and upper and lower extremities with static positions and dynamic movement.   Patient educated on the importance of strong core and lower extremity musculature in order to improve both static and dynamic balance, improve gait mechanics, reduce fall risk and improve household and community mobility.     Written Home Exercises Provided:  eval findings and traction outcome .  Exercises were reviewed and Nely was able to demonstrate them prior to the end of the session.  Nely demonstrated fair  understanding of the education provided.     See EMR under  -  for exercises provided  - .    Assessment   Nely is a 53 y.o. female referred to outpatient Physical Therapy with a medical diagnosis of lumbar radiculopathy. Patient presents with extreme pain secondary to left sciatic pain. Patient was shifting through all positions during the evaluation. Patient has normal sensation along lower extremities dermatomes but weak with left manual muscle test. Manual muscle testing caused pain in the low back. Positive for discogenic symptoms. Patient did have a hard fall a few days prior to this sciatic pain  occurring. Patient did land on buttocks during this fall. Difficult to assess pelvic positioning for a possible innonimate rotation secondary to high pain level.  Did not finish all evaluation secondary to patient having a high pain level and difficulty with positions throughout the session. It was decided to place patient on the traction table to attempt spinal decompression to ease sciatic symptoms. Patient was unable to tolerate moderate level pull, therefore the traction pound pull was kept minimal. Patient had more pain after the traction. Patient has the understanding that traction may not be an immediate relief. Will re-assess traction outcome next session and determine if this will be completed again. Patient will benefit from skilled physical therapy at this time to address deficits with core stabilization, stretching, nerve glides, dry needling, manual, myofascial release, traction and modalities as needed.     Patient prognosis is Guarded.     Patient will benefit from skilled outpatient Physical Therapy to address the deficits stated above and in the chart below, provide patient /family education, and to maximize patientt's level of independence.     Plan of care discussed with patient: Yes  Patient's spiritual, cultural and educational needs considered and patient is agreeable to the plan of care and goals as stated below:     Anticipated Barriers for therapy: sciatic symptoms      Medical Necessity is demonstrated by the following  History  Co-morbidities and personal factors that may impact the plan of care [x] LOW: no personal factors / co-morbidities  [] MODERATE: 1-2 personal factors / co-morbidities  [] HIGH: 3+ personal factors / co-morbidities    Moderate / High Support Documentation:   Co-morbidities affecting plan of care: -    Personal Factors:   obesity     Examination  Body Structures and Functions, activity limitations and participation restrictions that may impact the plan of care [x]  LOW: addressing 1-2 elements  [] MODERATE: 3+ elements  [] HIGH: 4+ elements (please support below)    Moderate / High Support Documentation: -     Clinical Presentation [] LOW: stable  [x] MODERATE: Evolving  [] HIGH: Unstable     Decision Making/ Complexity Score: low         Goals:  Short Term Goals: 6 weeks   Patient will be able to sleep ~ 3 hours without waking from pain  Patient will improve manual muscle test to 4/5 for left lower extremities with no pain in the back during testing  Patient will maintain prolonged positions x 15 minutes with 4 /10 pain  Patient will report a change from constant to intermittent in the low back  Patient will report no radicular symptoms in the left leg     Long Term Goals: 12 weeks   Patient will be able to sleep through the night without waking from pain  Patient will improve manual muscle test to 4+/5 for bilateral lower extremities for decreased change of falls   Patient will maintain prolonged positions x 30 minutes with   2/10 pain  Patient will be independent with home exercise program by D/C    Plan   Plan of care Certification: 11/27/2023 to 2/19/24.    Outpatient Physical Therapy 2 times weekly for 12 weeks to include the following interventions: Aquatic Therapy, Hybresis with Dex, Cervical/Lumbar Traction, Electrical Stimulation IFC/Premod, Gait Training, Manual Therapy, Moist Heat/ Ice, Neuromuscular Re-ed, Patient Education, Self Care, Therapeutic Activities, Therapeutic Exercise, Ultrasound, and Dry Needling.     PATRICIA CORTEZ, PT        I CERTIFY THE NEED FOR THESE SERVICES FURNISHED UNDER THIS PLAN OF TREATMENT AND WHILE UNDER MY CARE.    Physician's comments:      Physician's Signature: ____________________________________________Date________________        Please fax this MD signed form to:  [facility]  [phone] fax

## 2023-11-29 ENCOUNTER — CLINICAL SUPPORT (OUTPATIENT)
Dept: REHABILITATION | Facility: HOSPITAL | Age: 53
End: 2023-11-29
Payer: COMMERCIAL

## 2023-11-29 DIAGNOSIS — M54.42 ACUTE LEFT-SIDED LOW BACK PAIN WITH LEFT-SIDED SCIATICA: Primary | ICD-10-CM

## 2023-11-29 PROCEDURE — 97112 NEUROMUSCULAR REEDUCATION: CPT | Mod: CQ

## 2023-11-29 PROCEDURE — 97110 THERAPEUTIC EXERCISES: CPT | Mod: CQ

## 2023-11-29 PROCEDURE — 97012 MECHANICAL TRACTION THERAPY: CPT | Mod: CQ

## 2023-11-29 NOTE — PROGRESS NOTES
[facility]   Physical Therapy Treatment Note      Name: Nely Sctot  Clinic Number: [id number]    Therapy Diagnosis: No diagnosis found.  Physician: Farzaneh He FNP    Visit Date: 11/29/2023    Physician Orders: PT Eval and Treat   Medical Diagnosis from Referral: Lumbar   Evaluation Date: 11/27/2023  Authorization Period Expiration: 11/7/24  Plan of Care Expiration: 2/19/24  Visit # / Visits authorized: 2/ 60  PTA Visit #: 1/5     Time In: 3:15 pm   Time Out: 4:10 pm   Total Billable Time: 55 minutes    Subjective     Pt reports: She was in a lot of pain after evaluation but the following day she could tell that traction may had helped.  She  was given today  compliant with home exercise program.  Response to previous treatment: first treatment after evaluation  Functional change: none    Pain: 8/10  Location: bilateral back      Objective      Objective Measures updated at progress report unless specified.     Treatment     Nely received the treatments listed below:      therapeutic exercises to develop strength, endurance, posture, and core stabilization for 15 minutes including:  NuStep 5 minutes  Slant board 3 x 30 second hold   Hamstring stretch 3 x 30 second hold   Piriformis stretch 4 x 15 second hold right leg only     manual therapy techniques: Joint mobilizations, Manual traction, and Myofacial release were applied to the: - for - minutes, including:    neuromuscular re-education activities to improve: Balance, Coordination, Proprioception, and Posture for 25 minutes. The following activities were included:  Supine Ball roll 10 x 10 second hold   Lower trunk rotation 2 x 10   Straight leg raise 2 x 10   Supine hip abduction 2 x 10 green theraband   Supine marches 2 x 10 green theraband         supervised modalities after being cleared for contradictions: Mechanical Traction:  Nely received intermittent mechanical traction to the lumbar spine at a  force of 48 pounds for a total of 15 minutes. Hold time of 45 seconds  and rest time for 15 seconds. Patient tolerated treatment well without any adverse effects.      Patient Education and Home Exercises       Education provided:   - home exercise program was given    Written Home Exercises Provided: yes. Exercises were reviewed and Nely was able to demonstrate them prior to the end of the session.  Nely demonstrated good  understanding of the education provided. See EMR under Patient Instructions for exercises provided during therapy sessions    Assessment     Case conference with Melody Dill DPT. Patient was was informed on correct body mechanics to perform exercises correctly. Patient had increased pain when in sitting but decreases when in supine. She was able to perform exercises in supine with decreased pain but unable to tolerated a lot of exercise in sitting. Ended session with traction for decompression of the spine. Will continue to progress patient as tolerated. Home exercise program was given today.      Nely Is progressing well towards her goals.   Pt prognosis is Good.     Pt will continue to benefit from skilled outpatient physical therapy to address the deficits listed in the problem list box on initial evaluation, provide pt/family education and to maximize pt's level of independence in the home and community environment.     Pt's spiritual, cultural and educational needs considered and pt agreeable to plan of care and goals.     Anticipated barriers to physical therapy: none    Goals:   Short Term Goals: 6 weeks   Patient will be able to sleep ~ 3 hours without waking from pain  Patient will improve manual muscle test to 4/5 for left lower extremities with no pain in the back during testing  Patient will maintain prolonged positions x 15 minutes with 4 /10 pain  Patient will report a change from constant to intermittent in the low back  Patient will report no radicular symptoms  in the left leg      Long Term Goals: 12 weeks   Patient will be able to sleep through the night without waking from pain  Patient will improve manual muscle test to 4+/5 for bilateral lower extremities for decreased change of falls   Patient will maintain prolonged positions x 30 minutes with   2/10 pain  Patient will be independent with home exercise program by D/C       Plan     Plan of care Certification: 11/27/2023 to 2/19/24.     Outpatient Physical Therapy 2 times weekly for 12 weeks to include the following interventions: Aquatic Therapy, Hybresis with Dex, Cervical/Lumbar Traction, Electrical Stimulation IFC/Premod, Gait Training, Manual Therapy, Moist Heat/ Ice, Neuromuscular Re-ed, Patient Education, Self Care, Therapeutic Activities, Therapeutic Exercise, Ultrasound, and Dry Needling.     Maria Eugenia Holly, PTA

## 2023-12-04 ENCOUNTER — CLINICAL SUPPORT (OUTPATIENT)
Dept: REHABILITATION | Facility: HOSPITAL | Age: 53
End: 2023-12-04
Payer: COMMERCIAL

## 2023-12-04 DIAGNOSIS — M54.42 ACUTE LEFT-SIDED LOW BACK PAIN WITH LEFT-SIDED SCIATICA: Primary | ICD-10-CM

## 2023-12-04 PROCEDURE — 97112 NEUROMUSCULAR REEDUCATION: CPT | Mod: CQ

## 2023-12-04 PROCEDURE — 97110 THERAPEUTIC EXERCISES: CPT | Mod: CQ

## 2023-12-04 NOTE — PROGRESS NOTES
[facility]   Physical Therapy Treatment Note      Name: Nely Scott  Clinic Number: [id number]    Therapy Diagnosis: No diagnosis found.  Physician: Farzaneh He FNP    Visit Date: 12/4/2023    Physician Orders: PT Eval and Treat   Medical Diagnosis from Referral: Lumbar   Evaluation Date: 11/27/2023  Authorization Period Expiration: 11/7/24  Plan of Care Expiration: 2/19/24  Visit # / Visits authorized: 2/ 60  PTA Visit #: 1/5     Time In: 4:00 pm   Time Out: 4:40 pm   Total Billable Time: 40 minutes    Subjective     Pt reports: Her pain is better.   She  was given today  compliant with home exercise program.  Response to previous treatment: first treatment after evaluation  Functional change: none    Pain: 5/10  Location: bilateral back      Objective      Objective Measures updated at progress report unless specified.     Treatment     eNly received the treatments listed below:      therapeutic exercises to develop strength, endurance, posture, and core stabilization for 15 minutes including:  NuStep 5 minutes  Slant board 3 x 30 second hold   Hamstring stretch 3 x 30 second hold   Ball roll outs 5 x 5 second hold   Piriformis stretch 4 x 15 second hold right leg only     manual therapy techniques: Joint mobilizations, Manual traction, and Myofacial release were applied to the: - for - minutes, including:    neuromuscular re-education activities to improve: Balance, Coordination, Proprioception, and Posture for 25 minutes. The following activities were included:  Supine Ball roll 10 x 10 second hold   Lower trunk rotation 2 x 10   Straight leg raise 2 x 10   Supine hip abduction 2 x 10 green theraband   Supine marches 2 x 10 green theraband         supervised modalities after being cleared for contradictions: Mechanical Traction:  Nely received intermittent mechanical traction to the lumbar spine at a force of 48 pounds for a total of 0 minutes. Hold  time of 45 seconds  and rest time for 15 seconds. Patient tolerated treatment well without any adverse effects.      Patient Education and Home Exercises       Education provided:   - home exercise program was given    Written Home Exercises Provided: yes. Exercises were reviewed and Nely was able to demonstrate them prior to the end of the session.  Nely demonstrated good  understanding of the education provided. See EMR under Patient Instructions for exercises provided during therapy sessions    Assessment     Case conference with Melody Dill DPT. Patient was was informed on correct body mechanics to perform exercises correctly. Patient had increased pain when in sitting but decreases when in supine. She was able to perform exercises in supine with decreased pain but unable to tolerated a lot of exercise in sitting. Will continue to progress patient as tolerated.    Nely Is progressing well towards her goals.   Pt prognosis is Good.     Pt will continue to benefit from skilled outpatient physical therapy to address the deficits listed in the problem list box on initial evaluation, provide pt/family education and to maximize pt's level of independence in the home and community environment.     Pt's spiritual, cultural and educational needs considered and pt agreeable to plan of care and goals.     Anticipated barriers to physical therapy: none    Goals:   Short Term Goals: 6 weeks   Patient will be able to sleep ~ 3 hours without waking from pain  Patient will improve manual muscle test to 4/5 for left lower extremities with no pain in the back during testing  Patient will maintain prolonged positions x 15 minutes with 4 /10 pain  Patient will report a change from constant to intermittent in the low back  Patient will report no radicular symptoms in the left leg      Long Term Goals: 12 weeks   Patient will be able to sleep through the night without waking from pain  Patient will improve manual  muscle test to 4+/5 for bilateral lower extremities for decreased change of falls   Patient will maintain prolonged positions x 30 minutes with   2/10 pain  Patient will be independent with home exercise program by D/C       Plan     Plan of care Certification: 11/27/2023 to 2/19/24.     Outpatient Physical Therapy 2 times weekly for 12 weeks to include the following interventions: Aquatic Therapy, Hybresis with Dex, Cervical/Lumbar Traction, Electrical Stimulation IFC/Premod, Gait Training, Manual Therapy, Moist Heat/ Ice, Neuromuscular Re-ed, Patient Education, Self Care, Therapeutic Activities, Therapeutic Exercise, Ultrasound, and Dry Needling.     Maria Eugenia Holly, PTA

## 2023-12-06 ENCOUNTER — CLINICAL SUPPORT (OUTPATIENT)
Dept: REHABILITATION | Facility: HOSPITAL | Age: 53
End: 2023-12-06
Payer: COMMERCIAL

## 2023-12-06 DIAGNOSIS — M54.42 ACUTE LEFT-SIDED LOW BACK PAIN WITH LEFT-SIDED SCIATICA: Primary | ICD-10-CM

## 2023-12-06 PROCEDURE — 97110 THERAPEUTIC EXERCISES: CPT | Mod: CQ

## 2023-12-06 PROCEDURE — 97112 NEUROMUSCULAR REEDUCATION: CPT | Mod: CQ

## 2023-12-06 NOTE — PROGRESS NOTES
[facility]   Physical Therapy Treatment Note      Name: Nely Scott  Clinic Number: [id number]    Therapy Diagnosis: No diagnosis found.  Physician: Farzaneh He FNP    Visit Date: 12/6/2023    Physician Orders: PT Eval and Treat   Medical Diagnosis from Referral: Lumbar   Evaluation Date: 11/27/2023  Authorization Period Expiration: 11/7/24  Plan of Care Expiration: 2/19/24  Visit # / Visits authorized: 4/ 60  PTA Visit #: 2/5     Time In: 3:15 pm   Time Out: 3:53 pm   Total Billable Time: 38 minutes    Subjective     Pt reports: Her pain is better.   She  was given today  compliant with home exercise program.  Response to previous treatment: first treatment after evaluation  Functional change: none    Pain: 2/10  Location: bilateral back      Objective      Objective Measures updated at progress report unless specified.     Treatment     Nely received the treatments listed below:      therapeutic exercises to develop strength, endurance, posture, and core stabilization for 13 minutes including:  NuStep 5 minutes  Slant board 3 x 30 second hold   Hamstring stretch 3 x 30 second hold   Ball roll outs 5 x 5 second hold   Piriformis stretch 4 x 15 second hold right leg only     manual therapy techniques: Joint mobilizations, Manual traction, and Myofacial release were applied to the: - for - minutes, including:    neuromuscular re-education activities to improve: Balance, Coordination, Proprioception, and Posture for 25 minutes. The following activities were included:  Supine Ball roll 10 x 10 second hold   Lower trunk rotation 2 x 10   Straight leg raise 2 x 10   Supine hip abduction 2 x 10 green theraband   Supine marches 2 x 10 green theraband    Clamshells 2 x 0 gray theraband bilateral        supervised modalities after being cleared for contradictions: Mechanical Traction:  Nely received intermittent mechanical traction to the lumbar spine at a force of  48 pounds for a total of 0 minutes. Hold time of 45 seconds  and rest time for 15 seconds. Patient tolerated treatment well without any adverse effects.      Patient Education and Home Exercises       Education provided:   - home exercise program was given    Written Home Exercises Provided: yes. Exercises were reviewed and Nely was able to demonstrate them prior to the end of the session.  Nely demonstrated good  understanding of the education provided. See EMR under Patient Instructions for exercises provided during therapy sessions    Assessment     Case conference with Melody Dill DPT. Patient was was informed on correct body mechanics to perform exercises correctly. Patient continue to have increased pain when she is laying in supine. She was able to perform exercises in supine with increased pain today. Will continue to progress patient as tolerated.    Nely Is progressing well towards her goals.   Pt prognosis is Good.     Pt will continue to benefit from skilled outpatient physical therapy to address the deficits listed in the problem list box on initial evaluation, provide pt/family education and to maximize pt's level of independence in the home and community environment.     Pt's spiritual, cultural and educational needs considered and pt agreeable to plan of care and goals.     Anticipated barriers to physical therapy: none    Goals:   Short Term Goals: 6 weeks   Patient will be able to sleep ~ 3 hours without waking from pain  Patient will improve manual muscle test to 4/5 for left lower extremities with no pain in the back during testing  Patient will maintain prolonged positions x 15 minutes with 4 /10 pain  Patient will report a change from constant to intermittent in the low back  Patient will report no radicular symptoms in the left leg      Long Term Goals: 12 weeks   Patient will be able to sleep through the night without waking from pain  Patient will improve manual muscle test  to 4+/5 for bilateral lower extremities for decreased change of falls   Patient will maintain prolonged positions x 30 minutes with   2/10 pain  Patient will be independent with home exercise program by D/C       Plan     Plan of care Certification: 11/27/2023 to 2/19/24.     Outpatient Physical Therapy 2 times weekly for 12 weeks to include the following interventions: Aquatic Therapy, Hybresis with Dex, Cervical/Lumbar Traction, Electrical Stimulation IFC/Premod, Gait Training, Manual Therapy, Moist Heat/ Ice, Neuromuscular Re-ed, Patient Education, Self Care, Therapeutic Activities, Therapeutic Exercise, Ultrasound, and Dry Needling.     Maria Eugenia Holly, PTA

## 2023-12-13 ENCOUNTER — CLINICAL SUPPORT (OUTPATIENT)
Dept: REHABILITATION | Facility: HOSPITAL | Age: 53
End: 2023-12-13
Payer: COMMERCIAL

## 2023-12-13 DIAGNOSIS — M54.42 ACUTE LEFT-SIDED LOW BACK PAIN WITH LEFT-SIDED SCIATICA: Primary | ICD-10-CM

## 2023-12-13 PROCEDURE — 97110 THERAPEUTIC EXERCISES: CPT | Mod: CQ

## 2023-12-13 PROCEDURE — 97112 NEUROMUSCULAR REEDUCATION: CPT | Mod: CQ

## 2023-12-13 NOTE — PROGRESS NOTES
[facility]   Physical Therapy Treatment Note      Name: Nely Scott  Clinic Number: [id number]    Therapy Diagnosis: No diagnosis found.  Physician: Farzaneh He FNP    Visit Date: 12/13/2023    Physician Orders: PT Eval and Treat   Medical Diagnosis from Referral: Lumbar   Evaluation Date: 11/27/2023  Authorization Period Expiration: 11/7/24  Plan of Care Expiration: 2/19/24  Visit # / Visits authorized: 5/ 60  PTA Visit #: 3/5     Time In: 3:15 pm   Time Out: 4:00 pm   Total Billable Time: 38 minutes    Subjective     Pt reports: Her pain is better.   She  was given today  compliant with home exercise program.  Response to previous treatment: first treatment after evaluation  Functional change: none    Pain: 0/10  Location: bilateral back      Objective      Objective Measures updated at progress report unless specified.     Treatment     Nley received the treatments listed below:      therapeutic exercises to develop strength, endurance, posture, and core stabilization for 13 minutes including:  NuStep 5 minutes  Slant board 3 x 30 second hold   Hamstring stretch 3 x 30 second hold   Ball roll outs 5 x 5 second hold   Piriformis stretch 4 x 15 second hold right leg only     manual therapy techniques: Joint mobilizations, Manual traction, and Myofacial release were applied to the: - for - minutes, including:    neuromuscular re-education activities to improve: Balance, Coordination, Proprioception, and Posture for 25 minutes. The following activities were included:  Supine Ball roll 10 x 10 second hold   Lower trunk rotation 2 x 10   Straight leg raise 2 x 10   Supine hip abduction 2 x 10 green theraband   Supine marches 2 x 10 green theraband    Clamshells 2 x 0 gray theraband bilateral        supervised modalities after being cleared for contradictions: Mechanical Traction:  Nely received intermittent mechanical traction to the lumbar spine at a force of  48 pounds for a total of 0 minutes. Hold time of 45 seconds  and rest time for 15 seconds. Patient tolerated treatment well without any adverse effects.      Patient Education and Home Exercises       Education provided:   - home exercise program was given    Written Home Exercises Provided: yes. Exercises were reviewed and Nely was able to demonstrate them prior to the end of the session.  Nely demonstrated good  understanding of the education provided. See EMR under Patient Instructions for exercises provided during therapy sessions    Assessment     Case conference with Melody Dill DPT. Patient was was informed on correct body mechanics to perform exercises correctly. Patient continue to have increased pain when she is laying in supine. Therapist will incorporate Cybex back extension and hip machine next visit. She was able to perform exercises in supine with increased pain in lower extremity. Will continue to progress patient as tolerated.    Nely Is progressing well towards her goals.   Pt prognosis is Good.     Pt will continue to benefit from skilled outpatient physical therapy to address the deficits listed in the problem list box on initial evaluation, provide pt/family education and to maximize pt's level of independence in the home and community environment.     Pt's spiritual, cultural and educational needs considered and pt agreeable to plan of care and goals.     Anticipated barriers to physical therapy: none    Goals:   Short Term Goals: 6 weeks   Patient will be able to sleep ~ 3 hours without waking from pain  Patient will improve manual muscle test to 4/5 for left lower extremities with no pain in the back during testing  Patient will maintain prolonged positions x 15 minutes with 4 /10 pain  Patient will report a change from constant to intermittent in the low back  Patient will report no radicular symptoms in the left leg      Long Term Goals: 12 weeks   Patient will be able to  sleep through the night without waking from pain  Patient will improve manual muscle test to 4+/5 for bilateral lower extremities for decreased change of falls   Patient will maintain prolonged positions x 30 minutes with   2/10 pain  Patient will be independent with home exercise program by D/C       Plan     Plan of care Certification: 11/27/2023 to 2/19/24.     Outpatient Physical Therapy 2 times weekly for 12 weeks to include the following interventions: Aquatic Therapy, Hybresis with Dex, Cervical/Lumbar Traction, Electrical Stimulation IFC/Premod, Gait Training, Manual Therapy, Moist Heat/ Ice, Neuromuscular Re-ed, Patient Education, Self Care, Therapeutic Activities, Therapeutic Exercise, Ultrasound, and Dry Needling.     Maria Eugenia Holly, PTA

## 2023-12-18 ENCOUNTER — CLINICAL SUPPORT (OUTPATIENT)
Dept: REHABILITATION | Facility: HOSPITAL | Age: 53
End: 2023-12-18
Payer: COMMERCIAL

## 2023-12-18 DIAGNOSIS — M54.42 ACUTE LEFT-SIDED LOW BACK PAIN WITH LEFT-SIDED SCIATICA: Primary | ICD-10-CM

## 2023-12-18 PROCEDURE — 97112 NEUROMUSCULAR REEDUCATION: CPT | Mod: CQ

## 2023-12-18 NOTE — PROGRESS NOTES
[facility]   Physical Therapy Treatment Note / Discharge Summary     Name: Nely Scott  Clinic Number: [id number]    Therapy Diagnosis: No diagnosis found.  Physician: Farzaneh He FNP    Visit Date: 12/18/2023    Physician Orders: PT Eval and Treat   Medical Diagnosis from Referral: Lumbar   Evaluation Date: 11/27/2023  Authorization Period Expiration: 11/7/24  Plan of Care Expiration: 2/19/24  Visit # / Visits authorized: 5/ 60  PTA Visit #: 3/5     Time In: 3:55 pm   Time Out: 4:18 pm   Total Billable Time: 23 minutes    Subjective     Pt reports: Her pain is better.   She  was given today  compliant with home exercise program.  Response to previous treatment: first treatment after evaluation  Functional change: none    Pain: 0/10  Location: bilateral back      Objective      Objective Measures updated at progress report unless specified.     Treatment     Nely received the treatments listed below:      therapeutic exercises to develop strength, endurance, posture, and core stabilization for 0 minutes including:  NuStep 5 minutes  Slant board 3 x 30 second hold   Hamstring stretch 3 x 30 second hold   Ball roll outs 5 x 5 second hold   Piriformis stretch 4 x 15 second hold right leg only     manual therapy techniques: Joint mobilizations, Manual traction, and Myofacial release were applied to the: - for - minutes, including:    neuromuscular re-education activities to improve: Balance, Coordination, Proprioception, and Posture for 23 minutes. The following activities were included:  Supine Ball roll 10 x 10 second hold   Lower trunk rotation 2 x 10   Straight leg raise 2 x 10   Supine hip abduction 2 x 10 green theraband   Supine marches 2 x 10 green theraband    Clamshells 2 x 0 gray theraband bilateral        supervised modalities after being cleared for contradictions: Mechanical Traction:  Nely received intermittent mechanical traction to the lumbar  spine at a force of 48 pounds for a total of 0 minutes. Hold time of 45 seconds  and rest time for 15 seconds. Patient tolerated treatment well without any adverse effects.      Patient Education and Home Exercises       Education provided:   - home exercise program was given    Written Home Exercises Provided: yes. Exercises were reviewed and Nely was able to demonstrate them prior to the end of the session.  Nely demonstrated good  understanding of the education provided. See EMR under Patient Instructions for exercises provided during therapy sessions    Assessment     Case conference with Melody Dill DPT. Patient was was informed on correct body mechanics to perform exercises correctly at home. Patient was given home exercise program today and verbalized. Patient states that she id doing well and is ready for D/C. FOTO score measured 67 today. Will continue to progress patient as tolerated.    Nely Is progressing well towards her goals.   Pt prognosis is Good.     Pt will continue to benefit from skilled outpatient physical therapy to address the deficits listed in the problem list box on initial evaluation, provide pt/family education and to maximize pt's level of independence in the home and community environment.     Pt's spiritual, cultural and educational needs considered and pt agreeable to plan of care and goals.     Anticipated barriers to physical therapy: none    Goals:   Short Term Goals: 6 weeks   Patient will be able to sleep ~ 3 hours without waking from pain Met   Patient will improve manual muscle test to 4/5 for left lower extremities with no pain in the back during testing MET   Patient will maintain prolonged positions x 15 minutes with 4 /10 pain MET   Patient will report a change from constant to intermittent in the low back MET  Patient will report no radicular symptoms in the left leg MET      Long Term Goals: 12 weeks   Patient will be able to sleep through the night  without waking from pain progressing   Patient will improve manual muscle test to 4+/5 for bilateral lower extremities for decreased change of falls MET   Patient will maintain prolonged positions x 30 minutes with   2/10 pain MET   Patient will be independent with home exercise program by D/C MET        Plan     Plan of care Certification: 11/27/2023 to 2/19/24.     Outpatient Physical Therapy 2 times weekly for 12 weeks to include the following interventions: Aquatic Therapy, Hybresis with Dex, Cervical/Lumbar Traction, Electrical Stimulation IFC/Premod, Gait Training, Manual Therapy, Moist Heat/ Ice, Neuromuscular Re-ed, Patient Education, Self Care, Therapeutic Activities, Therapeutic Exercise, Ultrasound, and Dry Needling.     Maria Eugenia Holly, PTA

## 2024-02-26 DIAGNOSIS — M25.562 LEFT KNEE PAIN, UNSPECIFIED CHRONICITY: Primary | ICD-10-CM

## 2024-02-27 ENCOUNTER — HOSPITAL ENCOUNTER (OUTPATIENT)
Dept: RADIOLOGY | Facility: HOSPITAL | Age: 54
Discharge: HOME | End: 2024-02-27
Attending: NURSE PRACTITIONER
Payer: COMMERCIAL

## 2024-02-27 ENCOUNTER — OFFICE VISIT (OUTPATIENT)
Dept: ORTHOPEDICS | Facility: CLINIC | Age: 54
End: 2024-02-27
Payer: COMMERCIAL

## 2024-02-27 DIAGNOSIS — M25.562 LEFT KNEE PAIN, UNSPECIFIED CHRONICITY: Primary | ICD-10-CM

## 2024-02-27 DIAGNOSIS — M25.562 LEFT KNEE PAIN, UNSPECIFIED CHRONICITY: ICD-10-CM

## 2024-02-27 PROCEDURE — 99212 OFFICE O/P EST SF 10 MIN: CPT | Mod: PBBFAC,25 | Performed by: NURSE PRACTITIONER

## 2024-02-27 PROCEDURE — 99203 OFFICE O/P NEW LOW 30 MIN: CPT | Mod: S$PBB,,, | Performed by: NURSE PRACTITIONER

## 2024-02-27 PROCEDURE — 73564 X-RAY EXAM KNEE 4 OR MORE: CPT | Mod: TC,LT

## 2024-02-27 PROCEDURE — 73564 X-RAY EXAM KNEE 4 OR MORE: CPT | Mod: 26,LT,, | Performed by: RADIOLOGY

## 2024-02-27 NOTE — PROGRESS NOTES
53 y.o. Female returns to clinic for a follow up visit regarding     ICD-10-CM ICD-9-CM   1. Left knee pain, unspecified chronicity  M25.562 719.46        Patient is here today with complaint of left knee pain.  Reports she has had left knee pain for years.  She has had off and on recent swelling.  No recent injuries.  Reports her knee gives away on her.  She is ambulating with the use of a cane today.  Reports any twisting motion is painful her knee.       Past Medical History:   Diagnosis Date    Anxiety     Fibromyalgia     Headache(784.0)     Loss of balance     Memory loss      Past Surgical History:   Procedure Laterality Date    DILATION AND CURETTAGE OF UTERUS      GASTRIC BYPASS  2005    HERNIA REPAIR      HYSTERECTOMY      spinal tap      TONSILLECTOMY, ADENOIDECTOMY           PHYSICAL EXAMINATION:    General    Constitutional: She is oriented to person, place, and time. She appears well-nourished.   HENT:   Head: Normocephalic and atraumatic.   Eyes: Pupils are equal, round, and reactive to light.   Neck: Neck supple.   Cardiovascular:  Normal rate and regular rhythm.            Pulmonary/Chest: Effort normal. No respiratory distress.   Abdominal: There is no abdominal tenderness. There is no guarding.   Neurological: She is alert and oriented to person, place, and time. She has normal reflexes.   Psychiatric: She has a normal mood and affect. Her behavior is normal. Judgment and thought content normal.             Left Knee Exam     Inspection   Swelling: present    Tenderness   The patient tender to palpation of the medial joint line and lateral joint line.    Range of Motion   Extension:  abnormal   Flexion:  abnormal     Tests   Meniscus   Carlito:  Medial - positive Lateral - positive  Stability   Lachman: normal (-1 to 2mm)   PCL-Posterior Drawer: normal (0 to 2mm)  Patella   Patellar apprehension: negative  Patellar Tracking: normal    Other   Sensation: normal    Muscle Strength   Left Lower  Extremity   Quadriceps:  4/5   Hamstrin/5     Vascular Exam       Left Pulses  Dorsalis Pedis:      2+  Posterior Tibial:      2+        IMAGING:  X-Ray Knee Complete 4 or More Views Left    Result Date: 2024  EXAMINATION: XR KNEE COMP 4 OR MORE VIEWS LEFT CLINICAL HISTORY: Pain in left knee COMPARISON: 2005 TECHNIQUE: XR KNEE 4 VIEWS LEFT FINDINGS: No evidence of fracture seen.  The alignment of the joints appears normal.  Mild tricompartmental degenerative change is present.  No soft tissue abnormality is seen.     Mild knee osteoarthrosis. Electronically signed by: Ras Amador Date:    2024 Time:    14:48     ASSESSMENT:      ICD-10-CM ICD-9-CM   1. Left knee pain, unspecified chronicity  M25.562 719.46       PLAN:     -Findings and treatment options were discussed with the patient  -All questions answered      We will order MRI left knee and have patient return to clinic with 1 of the physicians once MRI results are back.    There are no Patient Instructions on file for this visit.      Orders Placed This Encounter   Procedures    MRI Knee Without Contrast Left         Procedures

## 2024-03-12 ENCOUNTER — HOSPITAL ENCOUNTER (OUTPATIENT)
Dept: RADIOLOGY | Facility: HOSPITAL | Age: 54
Discharge: HOME | End: 2024-03-12
Attending: NURSE PRACTITIONER
Payer: COMMERCIAL

## 2024-03-12 DIAGNOSIS — M25.562 LEFT KNEE PAIN, UNSPECIFIED CHRONICITY: ICD-10-CM

## 2024-03-12 PROCEDURE — 73721 MRI JNT OF LWR EXTRE W/O DYE: CPT | Mod: 26,LT,, | Performed by: STUDENT IN AN ORGANIZED HEALTH CARE EDUCATION/TRAINING PROGRAM

## 2024-03-12 PROCEDURE — 73721 MRI JNT OF LWR EXTRE W/O DYE: CPT | Mod: TC,LT

## 2024-03-21 ENCOUNTER — OFFICE VISIT (OUTPATIENT)
Dept: ORTHOPEDICS | Facility: CLINIC | Age: 54
End: 2024-03-21
Payer: COMMERCIAL

## 2024-03-21 DIAGNOSIS — S82.143A POSTERIOR TIBIAL PLATEAU FRACTURE, UNSPECIFIED LATERALITY, CLOSED, INITIAL ENCOUNTER: Primary | ICD-10-CM

## 2024-03-21 PROCEDURE — 27530 TREAT KNEE FRACTURE: CPT | Mod: S$PBB,LT,, | Performed by: ORTHOPAEDIC SURGERY

## 2024-03-21 PROCEDURE — 27530 TREAT KNEE FRACTURE: CPT | Mod: PBBFAC,LT | Performed by: ORTHOPAEDIC SURGERY

## 2024-03-21 PROCEDURE — 99214 OFFICE O/P EST MOD 30 MIN: CPT | Mod: S$PBB,57,, | Performed by: ORTHOPAEDIC SURGERY

## 2024-03-21 PROCEDURE — 99212 OFFICE O/P EST SF 10 MIN: CPT | Mod: PBBFAC | Performed by: ORTHOPAEDIC SURGERY

## 2024-03-21 RX ORDER — CELECOXIB 200 MG/1
200 CAPSULE ORAL 2 TIMES DAILY
Qty: 60 CAPSULE | Refills: 2 | Status: SHIPPED | OUTPATIENT
Start: 2024-03-21

## 2024-03-21 NOTE — PROGRESS NOTES
CC:  Knee pain    53 y.o. Female returns to clinic for a follow up visit regarding knee pain.      She saw Babs Rubalcava last week with left knee pain. She ordered a MRI and referred her to me.  She states she had an injury around 10 or 11 days ago she has had a fall.  She has had repeated falls over the last several months.  Complains of laterally based and posteriorly based left knee pain with pain extending into the mid aspect of the anterior lateral leg       Past Medical History:   Diagnosis Date    Anxiety     Fibromyalgia     Headache(784.0)     Loss of balance     Memory loss      Past Surgical History:   Procedure Laterality Date    DILATION AND CURETTAGE OF UTERUS      GASTRIC BYPASS  2005    HERNIA REPAIR      HYSTERECTOMY      spinal tap      TONSILLECTOMY, ADENOIDECTOMY           PHYSICAL EXAMINATION:  There were no vitals taken for this visit.  General    Constitutional: She is oriented to person, place, and time. She appears well-developed and well-nourished.   HENT:   Head: Normocephalic and atraumatic.   Nose: Nose normal.   Eyes: EOM are normal. Pupils are equal, round, and reactive to light.   Neck: Neck supple.   Cardiovascular:  Normal rate, regular rhythm and intact distal pulses.            Pulmonary/Chest: Effort normal. No respiratory distress. She exhibits no tenderness.   Abdominal: Soft. She exhibits no distension. There is no abdominal tenderness. There is no guarding.   Neurological: She is alert and oriented to person, place, and time. She has normal reflexes.   Psychiatric: She has a normal mood and affect. Her behavior is normal. Judgment and thought content normal.             Left Knee Exam     Inspection   Erythema: absent  Swelling: absent  Effusion: present  Deformity: absent  Bruising: absent    Tenderness   The patient tender to palpation of the patella.    Crepitus   The patient has crepitus of the patella.    Range of Motion   Extension:  normal   Flexion:  normal      Tests   Meniscus   Carlito:  Medial - positive   Stability   Lachman: normal (-1 to 2mm)   Patella   Passive Patellar Tilt: lateral tilt  Patellar Tracking: normal  Q-Angle at 90 degrees: normal  Patellar Grind: positive    Other   Meniscal Cyst: present  Popliteal (Baker's) Cyst: absent  Sensation: normal    Comments:  Pain with Thessaly Maneuver.     Vascular Exam       Left Pulses  Dorsalis Pedis:      2+  Posterior Tibial:      2+            IMAGING:  MRI Knee Without Contrast Left    Result Date: 3/12/2024  EXAMINATION: MRI KNEE WITHOUT CONTRAST LEFT CLINICAL HISTORY: Knee pain, chronic, positive xray (Age >= 5y); Pain in left knee TECHNIQUE: Multiplanar multisequence MRI of the left knee without intravenous contrast COMPARISON: 02/27/2024 FINDINGS: Signal abnormality within the ACL, findings which can be seen in partial thickness tear. PCL is intact. Complex tear involving the anterior body/anterior horn with extension into the anterior root of the lateral meniscus. Horizontal longitudinal tear involving the posterior body/posterior horn of the medial meniscus, series 501, image 7. The medial and lateral collateral ligament complexes are intact without evidence of abnormality. Mild tendinopathy of the patellar and quadriceps tendon. Acute nondisplaced fracture of the lateral tibial plateau. Subchondral cyst located about the tibial eminence.  Mild cartilage thinning of the medial, lateral compartments as well as the patellofemoral compartments. 4.6 cm Baker's cyst.     Complex tear involving the anterior body/anterior horn with extension into the anterior root of the lateral meniscus. Horizontal longitudinal tear involving the posterior body/posterior horn of the medial meniscus, series 501, image 7. Signal abnormality within the ACL, findings which can be seen in partial thickness tear. Acute nondisplaced fracture of the lateral tibial plateau. Electronically signed by: Du Burton  Date:    03/12/2024 Time:    14:29    X-Ray Knee Complete 4 or More Views Left    Result Date: 2/27/2024  EXAMINATION: XR KNEE COMP 4 OR MORE VIEWS LEFT CLINICAL HISTORY: Pain in left knee COMPARISON: 19 August 2005 TECHNIQUE: XR KNEE 4 VIEWS LEFT FINDINGS: No evidence of fracture seen.  The alignment of the joints appears normal.  Mild tricompartmental degenerative change is present.  No soft tissue abnormality is seen.     Mild knee osteoarthrosis. Electronically signed by: Ras Amador Date:    02/27/2024 Time:    14:48       ASSESSMENT:      ICD-10-CM ICD-9-CM   1. Posterior tibial plateau fracture, unspecified laterality, closed, initial encounter  S82.143A 823.00       PLAN:     -Findings and treatment options were discussed with the patient  -All questions answered  Natural history and expected course discussed. Questions answered.  Educational materials distributed.  Rest, ice, compression, and elevation (RICE) therapy.  She has meniscus tear and bone bruising with a questionable posterior lateral tibial plateau fracture.  I think the 1st order of business would be to treat this significant bone bruise for lateral tibial plateau fracture with rest and crutch immobilization I am not going to give her a steroid shot today may consider that in about 4 weeks will allow this significant bone bruise to heal and see her back in about 4 weeks with repeat x-rays.  Crutches  Nwb  See back in 4-6 weeks with xrays  There are no Patient Instructions on file for this visit.      No orders of the defined types were placed in this encounter.        Procedures

## 2024-04-22 DIAGNOSIS — S82.143A POSTERIOR TIBIAL PLATEAU FRACTURE, UNSPECIFIED LATERALITY, CLOSED, INITIAL ENCOUNTER: Primary | ICD-10-CM

## 2024-04-23 ENCOUNTER — HOSPITAL ENCOUNTER (OUTPATIENT)
Dept: RADIOLOGY | Facility: HOSPITAL | Age: 54
Discharge: HOME | End: 2024-04-23
Attending: ORTHOPAEDIC SURGERY
Payer: COMMERCIAL

## 2024-04-23 ENCOUNTER — OFFICE VISIT (OUTPATIENT)
Dept: ORTHOPEDICS | Facility: CLINIC | Age: 54
End: 2024-04-23
Payer: COMMERCIAL

## 2024-04-23 DIAGNOSIS — S82.143A POSTERIOR TIBIAL PLATEAU FRACTURE, UNSPECIFIED LATERALITY, CLOSED, INITIAL ENCOUNTER: ICD-10-CM

## 2024-04-23 DIAGNOSIS — S82.143A POSTERIOR TIBIAL PLATEAU FRACTURE, UNSPECIFIED LATERALITY, CLOSED, INITIAL ENCOUNTER: Primary | ICD-10-CM

## 2024-04-23 PROCEDURE — 73560 X-RAY EXAM OF KNEE 1 OR 2: CPT | Mod: 26,LT,, | Performed by: ORTHOPAEDIC SURGERY

## 2024-04-23 PROCEDURE — 99212 OFFICE O/P EST SF 10 MIN: CPT | Mod: PBBFAC,25 | Performed by: ORTHOPAEDIC SURGERY

## 2024-04-23 PROCEDURE — 99999PBSHW PR PBB SHADOW TECHNICAL ONLY FILED TO HB: Mod: PBBFAC,,,

## 2024-04-23 PROCEDURE — 20610 DRAIN/INJ JOINT/BURSA W/O US: CPT | Mod: PBBFAC,RT | Performed by: ORTHOPAEDIC SURGERY

## 2024-04-23 PROCEDURE — 99024 POSTOP FOLLOW-UP VISIT: CPT | Mod: S$PBB,,, | Performed by: ORTHOPAEDIC SURGERY

## 2024-04-23 PROCEDURE — 73560 X-RAY EXAM OF KNEE 1 OR 2: CPT | Mod: TC,LT

## 2024-04-23 RX ORDER — BUPIVACAINE HYDROCHLORIDE 5 MG/ML
3 INJECTION, SOLUTION PERINEURAL
Status: DISCONTINUED | OUTPATIENT
Start: 2024-04-23 | End: 2024-04-23 | Stop reason: HOSPADM

## 2024-04-23 RX ORDER — TRIAMCINOLONE ACETONIDE 40 MG/ML
40 INJECTION, SUSPENSION INTRA-ARTICULAR; INTRAMUSCULAR
Status: DISCONTINUED | OUTPATIENT
Start: 2024-04-23 | End: 2024-04-23 | Stop reason: HOSPADM

## 2024-04-23 RX ADMIN — BUPIVACAINE HYDROCHLORIDE 3 ML: 5 INJECTION, SOLUTION PERINEURAL at 03:04

## 2024-04-23 RX ADMIN — TRIAMCINOLONE ACETONIDE 40 MG: 400 INJECTION, SUSPENSION INTRA-ARTICULAR; INTRAMUSCULAR at 03:04

## 2024-04-23 NOTE — PROGRESS NOTES
CC:  Knee pain    54 y.o. Female returns to clinic for a follow up visit regarding knee pain.              Past Medical History:   Diagnosis Date    Anxiety     Fibromyalgia     Headache(784.0)     Loss of balance     Memory loss      Past Surgical History:   Procedure Laterality Date    DILATION AND CURETTAGE OF UTERUS      GASTRIC BYPASS  2005    HERNIA REPAIR      HYSTERECTOMY      spinal tap      TONSILLECTOMY, ADENOIDECTOMY           PHYSICAL EXAMINATION:  There were no vitals taken for this visit.  General    Constitutional: She is oriented to person, place, and time. She appears well-developed and well-nourished.   HENT:   Head: Normocephalic and atraumatic.   Nose: Nose normal.   Eyes: EOM are normal. Pupils are equal, round, and reactive to light.   Cardiovascular:  Normal rate and intact distal pulses.            Pulmonary/Chest: Effort normal. No respiratory distress. She exhibits no tenderness.   Abdominal: Soft. She exhibits no distension. There is no abdominal tenderness.   Neurological: She is alert and oriented to person, place, and time. She has normal reflexes.   Psychiatric: She has a normal mood and affect. Her behavior is normal. Judgment and thought content normal.             Left Knee Exam     Inspection   Swelling: present  Effusion: present  Deformity: absent    Tenderness   The patient tender to palpation of the medial joint line.    Crepitus   The patient has crepitus of the medial joint line.    Range of Motion   Extension:  abnormal   Flexion:  abnormal     Tests   Meniscus   Carlito:  Medial - positive     Other   Meniscal Cyst: present  Popliteal (Baker's) Cyst: absent    Comments:  Pain with Thessaly Maneuver.         IMAGING:  X-Ray Knee 1 or 2 View Left    Result Date: 4/23/2024  See Procedure Notes for results. IMPRESSION: Please see Ortho procedure notes for report.  This procedure was auto-finalized by: Virtual Radiologist     2 views of the knee were obtained today. Neutral  alignment. No discernable fractures  ASSESSMENT:      ICD-10-CM ICD-9-CM   1. Posterior tibial plateau fracture, unspecified laterality, closed, initial encounter  S82.143A 823.00       PLAN:     -Findings and treatment options were discussed with the patient  -All questions answered  Natural history and expected course discussed. Questions answered.  Educational materials distributed.  Rest, ice, compression, and elevation (RICE) therapy.  Arthrocentesis. See procedure note.  Injection today.  See back in 4 weeks. If no better, may consider arthroscopic intervention    There are no Patient Instructions on file for this visit.      Orders Placed This Encounter   Procedures    Large Joint Aspiration/Injection: L knee         Procedures

## 2024-04-23 NOTE — PROCEDURES
I left a message for the pt that the October schedule is not open as of yet and I will place her on the hold list for the apt.   Large Joint Aspiration/Injection: L knee    Date/Time: 4/23/2024 3:30 PM    Performed by: Bro Wallace MD  Authorized by: Bro Wallace MD    Consent Done?:  Yes (Verbal)  Indications:  Pain    Details:  Needle Size:  22 G  Approach:  Superior  Location:  Knee  Site:  L knee  Medications:  3 mL BUPivacaine 0.5 % (5 mg/mL); 40 mg triamcinolone acetonide 40 mg/mL  Patient tolerance:  Patient tolerated the procedure well with no immediate complications

## 2024-05-22 DIAGNOSIS — S82.143A POSTERIOR TIBIAL PLATEAU FRACTURE, UNSPECIFIED LATERALITY, CLOSED, INITIAL ENCOUNTER: Primary | ICD-10-CM

## 2024-05-23 ENCOUNTER — HOSPITAL ENCOUNTER (OUTPATIENT)
Dept: RADIOLOGY | Facility: HOSPITAL | Age: 54
Discharge: HOME | End: 2024-05-23
Attending: ORTHOPAEDIC SURGERY
Payer: COMMERCIAL

## 2024-05-23 ENCOUNTER — OFFICE VISIT (OUTPATIENT)
Dept: ORTHOPEDICS | Facility: CLINIC | Age: 54
End: 2024-05-23
Payer: COMMERCIAL

## 2024-05-23 DIAGNOSIS — S82.143A POSTERIOR TIBIAL PLATEAU FRACTURE, UNSPECIFIED LATERALITY, CLOSED, INITIAL ENCOUNTER: Primary | ICD-10-CM

## 2024-05-23 DIAGNOSIS — S82.143A POSTERIOR TIBIAL PLATEAU FRACTURE, UNSPECIFIED LATERALITY, CLOSED, INITIAL ENCOUNTER: ICD-10-CM

## 2024-05-23 PROCEDURE — 73560 X-RAY EXAM OF KNEE 1 OR 2: CPT | Mod: 26,LT,, | Performed by: ORTHOPAEDIC SURGERY

## 2024-05-23 PROCEDURE — 99024 POSTOP FOLLOW-UP VISIT: CPT | Mod: ,,, | Performed by: ORTHOPAEDIC SURGERY

## 2024-05-23 PROCEDURE — 73560 X-RAY EXAM OF KNEE 1 OR 2: CPT | Mod: TC,LT

## 2024-05-23 PROCEDURE — 99212 OFFICE O/P EST SF 10 MIN: CPT | Mod: PBBFAC,25 | Performed by: ORTHOPAEDIC SURGERY

## 2024-05-23 NOTE — PROGRESS NOTES
54 y.o. Female returns to clinic for a follow up visit regarding     ICD-10-CM ICD-9-CM   1. Posterior tibial plateau fracture, unspecified laterality, closed, initial encounter  S82.143A 823.00       Patient is doing well.   She states that the pain is better some days that others.   She states today her pain is 8/10 and feels achy, but she was very active yesterday and feel that is probably why.       Past Medical History:   Diagnosis Date    Anxiety     Fibromyalgia     Headache(784.0)     Loss of balance     Memory loss      Past Surgical History:   Procedure Laterality Date    DILATION AND CURETTAGE OF UTERUS      GASTRIC BYPASS  2005    HERNIA REPAIR      HYSTERECTOMY      spinal tap      TONSILLECTOMY, ADENOIDECTOMY           PHYSICAL EXAMINATION:    Ortho/SPM Exam  Satisfactory stability to varus and valgus stress negative Lachman's negative posterior drawer    IMAGING:  X-Ray Knee 1 or 2 View Left    Result Date: 5/23/2024  See Procedure Notes for results. IMPRESSION: Please see Ortho procedure notes for report.  This procedure was auto-finalized by: Virtual Radiologist     Two views left knee demonstrate no discernible fracture or pathologic bone  ASSESSMENT:      ICD-10-CM ICD-9-CM   1. Posterior tibial plateau fracture, unspecified laterality, closed, initial encounter  S82.143A 823.00       PLAN:     -Findings and treatment options were discussed with the patient  -All questions answered      Recommend physical therapy for her healing tibial plateau fracture.  Can see back in 4-6 weeks for recheck    There are no Patient Instructions on file for this visit.      No orders of the defined types were placed in this encounter.        Procedures

## 2024-06-05 ENCOUNTER — CLINICAL SUPPORT (OUTPATIENT)
Dept: REHABILITATION | Facility: HOSPITAL | Age: 54
End: 2024-06-05
Payer: COMMERCIAL

## 2024-06-05 DIAGNOSIS — R29.898 WEAKNESS OF LEFT LEG: ICD-10-CM

## 2024-06-05 DIAGNOSIS — S82.142D CLOSED FRACTURE OF LEFT TIBIAL PLATEAU WITH ROUTINE HEALING: Primary | ICD-10-CM

## 2024-06-05 DIAGNOSIS — S82.143A POSTERIOR TIBIAL PLATEAU FRACTURE, UNSPECIFIED LATERALITY, CLOSED, INITIAL ENCOUNTER: ICD-10-CM

## 2024-06-05 PROCEDURE — 97162 PT EVAL MOD COMPLEX 30 MIN: CPT

## 2024-06-05 NOTE — PLAN OF CARE
[facility]   Physical Therapy Initial Evaluation      Name: Nely Scott  Clinic Number: [id number]    Therapy Diagnosis: Left Tibial Plateau Fracture   Physician: Bro Wallace MD    Physician Orders: PT Eval and Treat   Medical Diagnosis from Referral: Left Tibial Plateau Fracture   Evaluation Date: 6/5/2024  Authorization Period Expiration: 5/23/2025  Plan of Care Expiration: 8/2/2024   Visit # / Visits authorized: 1/ 60 (Hard Max)   FOTO: 40/100    Precautions: Standard     Time In: 2:40 pm   Time Out: 3:25 pm   Total Appointment Time (timed & untimed codes): 45 minutes    Subjective     Date of onset: 2/1/2024    History of current condition - Nely reports: the Left Knee has been unstable for years following a knee injury in [place]. The knee would give out on her frequently.  She fell twice in February 2024 causing pain in Left Knee and increased instability. She was seen by Dr Bro Wallace, Orthopedic Surgeon here at [facility] on 2/27/2024 and an MRI was ordered. Patient followed up with MD on 3/21/2024 for MRI results. MRI was performed that showed ACL tear, tear in the Medial Meniscus, tear in the Lateral Meniscus, Bone bruising of the Tibia with questionable posterior latera tibial plateau fracture. Patient was immobilized and placed on crutches for 4 weeks and then returned to the clinic to see Dr Wallace on 4/23/204. At that time she was given an injection in the Left knee and told to continue to rest the leg to allow continued healing. When she returned to MD on 5/23/2024 X Rays showed the tibial plateau fracture is healing. MD ordered Outpatient Physical Therapy and patient is here today for the Evaluation.  She reports she still has pain in the Left knee but it varies from day to day depending upon her activity level.          Falls: February 2024 causing the Tibial Plateau Fracture; Left Knee has been unstable for years causing multiple falls and near falls  over the years     Imaging: MRI studies:   MRI Knee Without Contrast Left     Result Date: 3/12/2024  EXAMINATION: MRI KNEE WITHOUT CONTRAST LEFT CLINICAL HISTORY: Knee pain, chronic, positive xray (Age >= 5y); Pain in left knee TECHNIQUE: Multiplanar multisequence MRI of the left knee without intravenous contrast COMPARISON: 02/27/2024 FINDINGS: Signal abnormality within the ACL, findings which can be seen in partial thickness tear. PCL is intact. Complex tear involving the anterior body/anterior horn with extension into the anterior root of the lateral meniscus. Horizontal longitudinal tear involving the posterior body/posterior horn of the medial meniscus, series 501, image 7. The medial and lateral collateral ligament complexes are intact without evidence of abnormality. Mild tendinopathy of the patellar and quadriceps tendon. Acute nondisplaced fracture of the lateral tibial plateau. Subchondral cyst located about the tibial eminence.  Mild cartilage thinning of the medial, lateral compartments as well as the patellofemoral compartments. 4.6 cm Baker's cyst.      Complex tear involving the anterior body/anterior horn with extension into the anterior root of the lateral meniscus. Horizontal longitudinal tear involving the posterior body/posterior horn of the medial meniscus, series 501, image 7. Signal abnormality within the ACL, findings which can be seen in partial thickness tear. Acute nondisplaced fracture of the lateral tibial plateau.     Prior Therapy: None   Social History:  lives with their family  Occupation: Stays at home   Prior Level of Function: Independent and Active   Current Level of Function: Pain and instability in Left Knee makes it difficult for her to stand and walk. Even prolonged sitting is very uncomfortable.     Pain:  Current 6/10, worst 10/10, best 3/10   Location: left knee    Description: Aching, Dull, Sharp, and Shooting  Aggravating Factors: Sitting, Standing, Walking, and Getting  "out of bed/chair  Easing Factors: relaxation, pain medication, Biofreeze, heating pad, and rest    Patients goals: "I want to stop hurting so bad."     Medical History:   Past Medical History:   Diagnosis Date    Anxiety     Fibromyalgia     Headache(784.0)     Loss of balance     Memory loss        Surgical History:   Nely Scott  has a past surgical history that includes Dilation and curettage of uterus; Hysterectomy; TONSILLECTOMY, ADENOIDECTOMY; spinal tap; Gastric bypass (2005); and Hernia repair.    Medications:   Nely has a current medication list which includes the following prescription(s): celecoxib, cholecalciferol (vitamin d3), and multivitamin with minerals.    Allergies:   Review of patient's allergies indicates:   Allergen Reactions    Codeine Hives, Itching and Rash        Objective          Observation :     Pronation : Slight over-pronation bilaterally       Range of Motion/Strength :                  Left Extremity                                                                        Right Extremity   AROM PROM Strength  Location  AROM    PROM   Strength                           105   4/5 P!   Knee    Flexion (140)  130   5/5    +2                  Extension (0)  +4                Knee Special Tests :     B. Knee  Lochman's test: right Negative left Positive  Anterior drawer: right Negative left Positive  Posterior drawer: right Negative left Negative  Varus stress test: right Negative left Negative  Valgus stress test: right Negative left Negative  PFJ grind test: right Negative left Positive  McMurrays: right Negative left Positive    Functional Impairments :  MRI showed damage to ACL, Medial Meniscus, Lateral Meniscus, and Fracture of the Tibial Plateau. Patient has significant pain in Left Knee at all times that worsens with activity. Her Left Knee is unstable with standing and walking and especially with walking down stairs. Feel she may have suffered an ACL injury back " in [place] and this is what made the knee so unstable for so many years. The knee gave out twice back in February which is what cased the Tibial Plateau fracture. Unsure if the Meniscus tears are new or old but some of her pain inside the knee seems to be generated from the area of the Medial and Lateral Meniscus. During ambulation, she has decreased stance time and analgic gait on the Left.     Balance : unable to perform unsupported single leg stance on the Left Lower Extremity due to pain and Left Knee instability.     Limitation/Restriction for FOTO Intake Knee Survey    Therapist reviewed FOTO scores for Nely Scott on 6/5/2024.   FOTO documents entered into [employer] - see Media section.    Limitation Score: 60%           Patient Education      Education provided:   - Discussed the findings from the Evaluation and Reviewed the Plan of Care.    Assessment     Nely is a 54 y.o. female referred to outpatient Physical Therapy with a medical diagnosis of Left Tibial Plateau Fracture. Nely reports: the Left Knee has been unstable for years following a knee injury in [place]. The knee would give out on her frequently.  She fell twice in February 2024 causing pain in Left Knee and increased instability. She was seen by Dr Bro Wallace, Orthopedic Surgeon here at [facility] on 2/27/2024 and an MRI was ordered. Patient followed up with MD on 3/21/2024 for MRI results. MRI was performed that showed ACL tear, tear in the Medial Meniscus, tear in the Lateral Meniscus, Bone bruising of the Tibia with questionable posterior latera tibial plateau fracture. Patient was immobilized and placed on crutches for 4 weeks and then returned to the clinic to see Dr Wallace on 4/23/204. At that time she was given an injection in the Left knee and told to continue to rest the leg to allow continued healing. When she returned to MD on 5/23/2024 X Rays showed the tibial plateau fracture is healing. MD ordered Outpatient  Physical Therapy and patient is here today for the Evaluation.  She reports she still has pain in the Left knee but it varies from day to day depending upon her activity level.    Patient presents with decreased Range of Motion in the Left Knee with Flexion more limited than extension. Patient has decreased Quad and Hamstring strength in the Left Lower Extremity. She is also unable to perform unsupported single leg stance on the Left Lower Extremity due to pain and Left Knee instability. Pain is constant and worsens with prolonged sitting, standing and walking.    MRI showed damage to ACL, Medial Meniscus, Lateral Meniscus, and Fracture of the Tibial Plateau. Patient has significant pain in Left Knee at all times that worsens with activity. Her Left Knee is unstable with standing and walking and especially with walking down stairs. Feel she may have suffered an ACL injury back in [facility] and this is what made the knee so unstable for so many years. The knee gave out twice back in February which is what cased the Tibial Plateau fracture. Unsure if the Meniscus tears are new or old but some of her pain inside the knee seems to be generated from the area of the Medial and Lateral Meniscus. Feel the ACL tear is contributing to her knee instability.  During ambulation, she has decreased stance time and analgic gait on the Left.   Patient will require Physical Therapy Intervention to address all deficits and work toward completion of all goals set. Therapist will refer patient back to MD upon completion of Therapy for further assessment and possible MRI if pain and dysfunction persist.       Patient prognosis is Fair.   Patient will benefit from skilled outpatient Physical Therapy to address the deficits stated above and in the chart below, provide patient /family education, and to maximize patientt's level of independence.     Plan of care discussed with patient: Yes  Patient's spiritual, cultural and educational  needs considered and patient is agreeable to the plan of care and goals as stated below:     Anticipated Barriers for therapy:  damage to ACL, Medial Meniscus, Lateral Meniscus, and Fracture of the Tibial Plateau; Left Knee is very unstable     Medical Necessity is demonstrated by the following  History  Co-morbidities and personal factors that may impact the plan of care [] LOW: no personal factors / co-morbidities  [x] MODERATE: 1-2 personal factors / co-morbidities  [] HIGH: 3+ personal factors / co-morbidities    Moderate / High Support Documentation:   Co-morbidities affecting plan of care:   Past Medical History:   Diagnosis Date    Anxiety     Fibromyalgia     Headache(784.0)     Loss of balance     Memory loss        has a past surgical history that includes Dilation and curettage of uterus; Hysterectomy; TONSILLECTOMY, ADENOIDECTOMY; spinal tap; Gastric bypass (2005); and Hernia repair.    Personal Factors:   no deficits     Examination  Body Structures and Functions, activity limitations and participation restrictions that may impact the plan of care [] LOW: addressing 1-2 elements  [x] MODERATE: 3+ elements  [] HIGH: 4+ elements (please support below)    Moderate / High Support Documentation:   decreased Range of Motion in the Left Knee with Flexion more limited than extension. Patient has decreased Quad and Hamstring strength in the Left Lower Extremity. She is also unable to perform unsupported single leg stance on the Left Lower Extremity due to pain and Left Knee instability. Pain is constant and worsens with prolonged sitting, standing and walking.    MRI showed damage to ACL, Medial Meniscus, Lateral Meniscus, and Fracture of the Tibial Plateau. Patient has significant pain in Left Knee at all times that worsens with activity. Her Left Knee is unstable with standing and walking and especially with walking down stairs.      Clinical Presentation [] LOW: stable  [x] MODERATE: Evolving - Will likely  require surgical intervention at the completion of Physical Therapy if patient's pain and dysfunction continue.  [] HIGH: Unstable     Decision Making/ Complexity Score: moderate       Goals:  Short Term Goals: 4 weeks   1. Independent with Home Exercise Program   2. Increase Left Knee Range of Motion to 0 Degrees to 120 Degrees  3. Increase Left Knee Strength to grossly 4+/5  4. Patient will ambulate 500 feet with Normal Gait pattern with complaints of pain Less than or Equal to 4/10.      Long Term Goals: 8 weeks   1. Patient will increase Left Knee Strength to grossly 5/5  2. Patient will ambulate 1000+ feet with complaints of pain Less than or Equal to 3/10.   3. Patient will perform stair negotiation with use of handrails and reciprocal gait.      Plan     Plan of care Certification: 6/5/2024 to 8/2/2024.    Outpatient Physical Therapy 2 times weekly for 8 weeks to include the following interventions: 46559 [therapeutic exercise], 61465 [neuromuscular re-education], 35984 [gait training], 87735 [manual therapy], 56071 [therapeutic activities], and 09005 [unattended electrical stimulation]    KARTHIK SHAVER, PT, DPT

## 2024-06-05 NOTE — PROGRESS NOTES
See Plan of Care     Sup Visit performed today with STEPHAN Estrada and STEPHAN Thomas.  All goals and treatment plan reviewed. Will work toward completion of all goals set.      First Treatment May Include :     Bike   SB  Hamstring Stretch on Stairs   Knee Flexion Stretch on Stairs     Supine :   Quad Sets  Heel Slides   Short Arc Quads   Straight Leg Raises   Hip Abduction   Bridging     Sitting :  Marching   LAQs  Hamstring Curls   Hip Abd/Add  Ankle Pumps     Standing :  Standing Marches  Squat to chair/Shallow standing knee bends  Hip Abduction   Heel Raises     Single Leg Stance - Firm   Single Leg Stance - Foam

## 2024-06-11 ENCOUNTER — CLINICAL SUPPORT (OUTPATIENT)
Dept: REHABILITATION | Facility: HOSPITAL | Age: 54
End: 2024-06-11
Payer: COMMERCIAL

## 2024-06-11 DIAGNOSIS — R29.898 WEAKNESS OF LEFT LEG: ICD-10-CM

## 2024-06-11 DIAGNOSIS — S82.142D CLOSED FRACTURE OF LEFT TIBIAL PLATEAU WITH ROUTINE HEALING: Primary | ICD-10-CM

## 2024-06-11 PROCEDURE — 97110 THERAPEUTIC EXERCISES: CPT | Mod: CQ

## 2024-06-11 PROCEDURE — 97530 THERAPEUTIC ACTIVITIES: CPT | Mod: CQ

## 2024-06-11 NOTE — PROGRESS NOTES
[facility]   Physical Therapy Treatment Note      Name: Nely Scott  Clinic Number: [id number]  Visit Date: 6/11/2024  Therapy Diagnosis: Left Tibial Plateau Fracture   Physician: Bro Wallace MD     Physician Orders: PT Eval and Treat   Medical Diagnosis from Referral: Left Tibial Plateau Fracture   Evaluation Date: 6/5/2024  Authorization Period Expiration: 5/23/2025  Plan of Care Expiration: 8/2/2024   Visit # / Visits authorized: 2/ 60 (Hard Max)   PTA Visit #: 1/5   FOTO: 40/100     Precautions: Standard      Time In: 4:00 pm   Time Out: 4:40 pm   Total Appointment Time (timed & untimed codes): 40 minutes     Subjective     Patient reports: the left knee feels ok today since she has not done much.  She  has not been given home exercise program yet  Response to previous treatment: first visit since evaluation   Functional change: n/a    Pain: 0/10  Location: left knee      Objective      Objective Measures updated at progress report unless specified.     Treatment     Nely received the treatments listed below:      therapeutic exercises to develop strength, endurance, ROM, and flexibility for 25 minutes including:    Bike/nustep x 5 minutes   SB 3 x 20 second hold   Hamstring Stretch on Stairs 4 x 20 second hold   Knee Flexion Stretch on Stairs 4 x 20 second hold     Supine :   Quad Sets x 20  Heel Slides x 20  Short Arc Quads x 20   Straight Leg Raises x 20   Hip Abduction (hooklying) x 20 with green band   Bridging x 20    Sitting :  Marching x 20 (B)  LAQs x 20 (LLE)  Hamstring Curls   Hip Abd/Add  Ankle Pumps     manual therapy techniques: - were applied to the: - for 0 minutes, including:    neuromuscular re-education activities to improve: Balance, Coordination, Kinesthetic, Sense, and Proprioception for 0 minutes. The following activities were included:  Single Leg Stance - Firm   Single Leg Stance - Foam     therapeutic activities to improve functional  performance for 15  minutes, including:    Standing :  Standing Marches 20 (B)  Squat to chair/Shallow standing knee bends x 20 (modified to mini squat due to pain when sitting all the way down to chair.   Hip Abduction x 20 (B)  Heel Raises x 20       Patient Education and Home Exercises       Education provided:    - basic knee home exercise program handout     Written Home Exercises Provided: yes. Exercises were reviewed and Nely was able to demonstrate them prior to the end of the session.  Nely demonstrated good  understanding of the education provided. See Electronic Medical Record under Patient Instructions for exercises provided during therapy sessions    Assessment     Supervisory visit with KARTHIK SHAVER, PT, DPT prior to initial PTA visit.     Patient arrived today for initial visit after evaluation with reports of no pain in the left knee. Therapist initiated Plan of Care and gave Patient the handout for the basic knee home exercise program. This handout included pictures and written instructions and was reviewed with Patient during tx session today to ensure she could correctly perform the exercises. Patient returned demonstration of proper form for each exercise. She did have pain with the squat to chair/Shallow standing knee bends so this was modified to a  mini squat due to pain when sitting all the way down to chair. Patient reported no other trouble with the other exercises. Informed her to try these before she returns to therapy on Friday. Will continue to progress as able.     PMH from evaluation:  marilu is a 54 y.o. female referred to outpatient Physical Therapy with a medical diagnosis of Left Tibial Plateau Fracture. Nely reports: the Left Knee has been unstable for years following a knee injury in [employer]. The knee would give out on her frequently.  She fell twice in February 2024 causing pain in Left Knee and increased instability. She was seen by Dr Bro Wallace, Orthopedic  Surgeon here at [facility] on 2/27/2024 and an MRI was ordered. Patient followed up with MD on 3/21/2024 for MRI results. MRI was performed that showed ACL tear, tear in the Medial Meniscus, tear in the Lateral Meniscus, Bone bruising of the Tibia with questionable posterior latera tibial plateau fracture. Patient was immobilized and placed on crutches for 4 weeks and then returned to the clinic to see Dr Wallace on 4/23/204. At that time she was given an injection in the Left knee and told to continue to rest the leg to allow continued healing. When she returned to MD on 5/23/2024 X Rays showed the tibial plateau fracture is healing. MD ordered Outpatient Physical Therapy and patient is here today for the Evaluation.  She reports she still has pain in the Left knee but it varies from day to day depending upon her activity level.    Patient presents with decreased Range of Motion in the Left Knee with Flexion more limited than extension. Patient has decreased Quad and Hamstring strength in the Left Lower Extremity. She is also unable to perform unsupported single leg stance on the Left Lower Extremity due to pain and Left Knee instability. Pain is constant and worsens with prolonged sitting, standing and walking.    MRI showed damage to ACL, Medial Meniscus, Lateral Meniscus, and Fracture of the Tibial Plateau. Patient has significant pain in Left Knee at all times that worsens with activity. Her Left Knee is unstable with standing and walking and especially with walking down stairs. Feel she may have suffered an ACL injury back in [facility] and this is what made the knee so unstable for so many years. The knee gave out twice back in February which is what cased the Tibial Plateau fracture. Unsure if the Meniscus tears are new or old but some of her pain inside the knee seems to be generated from the area of the Medial and Lateral Meniscus. Feel the ACL tear is contributing to her knee instability.  During  ambulation, she has decreased stance time and analgic gait on the Left.   Patient will require Physical Therapy Intervention to address all deficits and work toward completion of all goals set. Therapist will refer patient back to MD upon completion of Therapy for further assessment and possible MRI if pain and dysfunction persist.     Nely Is progressing well towards her goals.   Patient prognosis is Fair.     Patient will continue to benefit from skilled outpatient physical therapy to address the deficits listed in the problem list box on initial evaluation, provide pt/family education and to maximize pt's level of independence in the home and community environment.     Patient's spiritual, cultural and educational needs considered and pt agreeable to plan of care and goals.     Anticipated Barriers for therapy:  damage to ACL, Medial Meniscus, Lateral Meniscus, and Fracture of the Tibial Plateau; Left Knee is very unstable     Goals:  Short Term Goals: 4 weeks   1. Independent with Home Exercise Program   2. Increase Left Knee Range of Motion to 0 Degrees to 120 Degrees  3. Increase Left Knee Strength to grossly 4+/5  4. Patient will ambulate 500 feet with Normal Gait pattern with complaints of pain Less than or Equal to 4/10.       Long Term Goals: 8 weeks   1. Patient will increase Left Knee Strength to grossly 5/5  2. Patient will ambulate 1000+ feet with complaints of pain Less than or Equal to 3/10.   3. Patient will perform stair negotiation with use of handrails and reciprocal gait.   Plan     Plan of care Certification: 6/5/2024 to 8/2/2024.     Outpatient Physical Therapy 2 times weekly for 8 weeks to include the following interventions: 60352 [therapeutic exercise], 90322 [neuromuscular re-education], 65869 [gait training], 23913 [manual therapy], 39676 [therapeutic activities], and 63658 [unattended electrical stimulation]    Mark Raman PTA   6/11/2024

## 2024-06-14 ENCOUNTER — CLINICAL SUPPORT (OUTPATIENT)
Dept: REHABILITATION | Facility: HOSPITAL | Age: 54
End: 2024-06-14
Payer: COMMERCIAL

## 2024-06-14 DIAGNOSIS — R29.898 WEAKNESS OF LEFT LEG: ICD-10-CM

## 2024-06-14 DIAGNOSIS — S82.142D CLOSED FRACTURE OF LEFT TIBIAL PLATEAU WITH ROUTINE HEALING: Primary | ICD-10-CM

## 2024-06-14 PROCEDURE — 97110 THERAPEUTIC EXERCISES: CPT | Mod: CQ

## 2024-06-14 PROCEDURE — 97112 NEUROMUSCULAR REEDUCATION: CPT | Mod: CQ

## 2024-06-14 PROCEDURE — 97530 THERAPEUTIC ACTIVITIES: CPT | Mod: CQ

## 2024-06-14 NOTE — PROGRESS NOTES
[facility]   Physical Therapy Treatment Note      Name: Nely Scott  Clinic Number: [id number]  Visit Date: 6/14/2024  Therapy Diagnosis: Left Tibial Plateau Fracture   Physician: Bro Wallace MD     Physician Orders: PT Eval and Treat   Medical Diagnosis from Referral: Left Tibial Plateau Fracture   Evaluation Date: 6/5/2024  Authorization Period Expiration: 5/23/2025  Plan of Care Expiration: 8/2/2024   Visit # / Visits authorized: 3/ 60 (Hard Max)   PTA Visit #: 2/5   FOTO: 40/100     Precautions: Standard      Time In: 10:17 am  Time Out: 10:54  am   Total Appointment Time (timed & untimed codes): 37 minutes     Subjective     Patient reports: her fibromyalgia is flared up today and the knee is achey.   She  has been  compliant with home exercise program.  Response to previous treatment: soreness noted  Functional change: n/a    Pain: 5/10  Location: left knee      Objective      Objective Measures updated at progress report unless specified.     Treatment     Nely received the treatments listed below:      therapeutic exercises to develop strength, endurance, ROM, and flexibility for 14 minutes including:    nustep x 5 minutes   SB 3 x 20 second hold   Hamstring Stretch on Stairs 4 x 20 second hold     Supine :   Quad Sets x 20  Straight Leg Raises x 20     Sitting :  Hamstring Curls x 3 plates x 20   Hip Abd/Add combo x 20 with soccer ball and green band   Ankle Pumps     manual therapy techniques: - were applied to the: - for 0 minutes, including:    neuromuscular re-education activities to improve: Balance, Coordination, Kinesthetic, Sense, and Proprioception for 8 minutes. The following activities were included:    Cable terminal knee extension 3 plates 20x5sh  Single Leg Stance - Firm 2 x 20 second hold (B)  Single Leg Stance - Foam     therapeutic activities to improve functional performance for 15  minutes, including:    Standing :  Standing Marches 20  "(B)  Hip Abduction x 20 (B)  Heel Raises x 20   Fwd step ups x 20 reebok step (deferred due to pain with popping in the knee toward middle of repetitions)     Patient Education and Home Exercises       Education provided:    - basic knee home exercise program handout with education on 6/11/2024    Written Home Exercises Provided: Patient instructed to cont prior HEP. Exercises were reviewed and Nely was able to demonstrate them prior to the end of the session.  Nely demonstrated good  understanding of the education provided. See Electronic Medical Record under Patient Instructions for exercises provided during therapy sessions    Assessment     Supervisory visit with KARTHIK SHAVER, PT, DPT prior to initial PTA visit.     Patient arrived today for her second visit after evaluation with reports of 5/10 pain in the left knee. She reports her fibromyalgia is a little flared up today and this has her knee a little achey. She does report compliance with home exercise program that was given to her at her last therapy session. Continued with Plan of Care and focused on quad control and strengthening into terminal knee extension. Added CCTKE to her plan today and she did really well with this. Only pain noted during session today was during fwd step ups on Reebok step where she did experience a "pop" in the knee and then pain towards middle of repetitions, so this was deferred. Encouraged Patient to be diligent with home exercise program and we willl continue to progress as able.       PMH from evaluation:  marilu is a 54 y.o. female referred to outpatient Physical Therapy with a medical diagnosis of Left Tibial Plateau Fracture. Nely reports: the Left Knee has been unstable for years following a knee injury in highschool. The knee would give out on her frequently.  She fell twice in February 2024 causing pain in Left Knee and increased instability. She was seen by Dr Bro Wallace, Orthopedic Surgeon here at " [facility] on 2/27/2024 and an MRI was ordered. Patient followed up with MD on 3/21/2024 for MRI results. MRI was performed that showed ACL tear, tear in the Medial Meniscus, tear in the Lateral Meniscus, Bone bruising of the Tibia with questionable posterior latera tibial plateau fracture. Patient was immobilized and placed on crutches for 4 weeks and then returned to the clinic to see Dr Wallace on 4/23/204. At that time she was given an injection in the Left knee and told to continue to rest the leg to allow continued healing. When she returned to MD on 5/23/2024 X Rays showed the tibial plateau fracture is healing. MD ordered Outpatient Physical Therapy and patient is here today for the Evaluation.  She reports she still has pain in the Left knee but it varies from day to day depending upon her activity level.    Patient presents with decreased Range of Motion in the Left Knee with Flexion more limited than extension. Patient has decreased Quad and Hamstring strength in the Left Lower Extremity. She is also unable to perform unsupported single leg stance on the Left Lower Extremity due to pain and Left Knee instability. Pain is constant and worsens with prolonged sitting, standing and walking.    MRI showed damage to ACL, Medial Meniscus, Lateral Meniscus, and Fracture of the Tibial Plateau. Patient has significant pain in Left Knee at all times that worsens with activity. Her Left Knee is unstable with standing and walking and especially with walking down stairs. Feel she may have suffered an ACL injury back in [facility] and this is what made the knee so unstable for so many years. The knee gave out twice back in February which is what cased the Tibial Plateau fracture. Unsure if the Meniscus tears are new or old but some of her pain inside the knee seems to be generated from the area of the Medial and Lateral Meniscus. Feel the ACL tear is contributing to her knee instability.  During ambulation, she has  decreased stance time and analgic gait on the Left.   Patient will require Physical Therapy Intervention to address all deficits and work toward completion of all goals set. Therapist will refer patient back to MD upon completion of Therapy for further assessment and possible MRI if pain and dysfunction persist.     Nely Is progressing well towards her goals.   Patient prognosis is Fair.     Patient will continue to benefit from skilled outpatient physical therapy to address the deficits listed in the problem list box on initial evaluation, provide pt/family education and to maximize pt's level of independence in the home and community environment.     Patient's spiritual, cultural and educational needs considered and pt agreeable to plan of care and goals.     Anticipated Barriers for therapy:  damage to ACL, Medial Meniscus, Lateral Meniscus, and Fracture of the Tibial Plateau; Left Knee is very unstable     Goals:  Short Term Goals: 4 weeks   1. Independent with Home Exercise Program   2. Increase Left Knee Range of Motion to 0 Degrees to 120 Degrees  3. Increase Left Knee Strength to grossly 4+/5  4. Patient will ambulate 500 feet with Normal Gait pattern with complaints of pain Less than or Equal to 4/10.       Long Term Goals: 8 weeks   1. Patient will increase Left Knee Strength to grossly 5/5  2. Patient will ambulate 1000+ feet with complaints of pain Less than or Equal to 3/10.   3. Patient will perform stair negotiation with use of handrails and reciprocal gait.   Plan     Plan of care Certification: 6/5/2024 to 8/2/2024.     Outpatient Physical Therapy 2 times weekly for 8 weeks to include the following interventions: 19977 [therapeutic exercise], 48121 [neuromuscular re-education], 50795 [gait training], 83409 [manual therapy], 00495 [therapeutic activities], and 30242 [unattended electrical stimulation]    Mark Raman PTA   6/14/2024

## 2024-06-18 ENCOUNTER — CLINICAL SUPPORT (OUTPATIENT)
Dept: REHABILITATION | Facility: HOSPITAL | Age: 54
End: 2024-06-18
Payer: COMMERCIAL

## 2024-06-18 DIAGNOSIS — S82.142D CLOSED FRACTURE OF LEFT TIBIAL PLATEAU WITH ROUTINE HEALING: Primary | ICD-10-CM

## 2024-06-18 DIAGNOSIS — R29.898 WEAKNESS OF LEFT LEG: ICD-10-CM

## 2024-06-18 PROCEDURE — 97110 THERAPEUTIC EXERCISES: CPT | Mod: CQ

## 2024-06-18 PROCEDURE — 97112 NEUROMUSCULAR REEDUCATION: CPT | Mod: CQ

## 2024-06-18 PROCEDURE — 97530 THERAPEUTIC ACTIVITIES: CPT | Mod: CQ

## 2024-06-18 NOTE — PROGRESS NOTES
[facility]   Physical Therapy Treatment Note      Name: Nely Scott  Clinic Number: [id number]  Visit Date: 6/18/2024  Therapy Diagnosis: Left Tibial Plateau Fracture   Physician: Bro Wallace MD     Physician Orders: PT Eval and Treat   Medical Diagnosis from Referral: Left Tibial Plateau Fracture   Evaluation Date: 6/5/2024  Authorization Period Expiration: 5/23/2025  Plan of Care Expiration: 8/2/2024   Visit # / Visits authorized: 4/ 60 (Hard Max)   PTA Visit #: 3/5   FOTO: 40/100     Precautions: Standard      Time In: 4:04 pm  Time Out: 4:41 pm   Total Appointment Time (timed & untimed codes): 37 minutes     Subjective     Patient reports: constant achey type pain in the left knee today. Wore platform sandals on Sunday and Monday and her knee has been hurting worse since then.    She  has been  compliant with home exercise program.  Response to previous treatment: soreness noted  Functional change: n/a    Pain: 7/10  Location: left knee      Objective      Objective Measures updated at progress report unless specified.     Treatment     Nely received the treatments listed below:      therapeutic exercises to develop strength, endurance, ROM, and flexibility for 14 minutes including:    nustep x 5 minutes   SB 3 x 20 second hold   Hamstring Stretch on Stairs 4 x 20 second hold     Supine :     Sitting :  Hamstring Curls x 3 plates x 20   Ankle Pumps     manual therapy techniques: - were applied to the: - for 0 minutes, including:    neuromuscular re-education activities to improve: Balance, Coordination, Kinesthetic, Sense, and Proprioception for 8 minutes. The following activities were included:    Cable terminal knee extension 3 plates 20x5sh  Single Leg Stance - Firm 2 x 20 second hold (B)  Single Leg Stance - Foam     therapeutic activities to improve functional performance for 15  minutes, including:    Standing :  Standing Marches 20 (B)  Squat to chair/Shallow  standing knee bends x 40 (modified to mini squat to black box (20 inches) while holding on to stair rails)   Hip Abduction x 30 (B) 1 plate   Heel Raises x 30 off step    Patient Education and Home Exercises       Education provided:    - basic knee home exercise program handout with education on 6/11/2024    Written Home Exercises Provided: Patient instructed to cont prior HEP. Exercises were reviewed and Nely was able to demonstrate them prior to the end of the session.  Nely demonstrated good  understanding of the education provided. See Electronic Medical Record under Patient Instructions for exercises provided during therapy sessions    Assessment       Supervisory visit with KARTHIK SHAVER, PT, DPT prior to initial PTA visit.     Patient arrived today for her third visit after evaluation with reports of 7/10 pain in the left knee. She describes this as constant achey type pain in the left knee today. She wore platform sandals on Sunday and Monday and her knee has been hurting worse since then.  Continued with Plan of Care and focused on quad control and strengthening into terminal knee extension. Added the cybex hip abduction machine today and modified squats today to the black box (20 inches high) while she was holding onto stair rail with cuing to keep her weight shifted back through her heels to decrease anterior knee p!. Patient tolerated this modification well. Encouraged Patient to be diligent with home exercise program and we willl continue to progress as able.       PMH from evaluation:  marilu is a 54 y.o. female referred to outpatient Physical Therapy with a medical diagnosis of Left Tibial Plateau Fracture. Nely reports: the Left Knee has been unstable for years following a knee injury in highschool. The knee would give out on her frequently.  She fell twice in February 2024 causing pain in Left Knee and increased instability. She was seen by Dr Bro Wallace, Orthopedic Surgeon here at  [facility] on 2/27/2024 and an MRI was ordered. Patient followed up with MD on 3/21/2024 for MRI results. MRI was performed that showed ACL tear, tear in the Medial Meniscus, tear in the Lateral Meniscus, Bone bruising of the Tibia with questionable posterior latera tibial plateau fracture. Patient was immobilized and placed on crutches for 4 weeks and then returned to the clinic to see Dr Wallace on 4/23/204. At that time she was given an injection in the Left knee and told to continue to rest the leg to allow continued healing. When she returned to MD on 5/23/2024 X Rays showed the tibial plateau fracture is healing. MD ordered Outpatient Physical Therapy and patient is here today for the Evaluation.  She reports she still has pain in the Left knee but it varies from day to day depending upon her activity level.    Patient presents with decreased Range of Motion in the Left Knee with Flexion more limited than extension. Patient has decreased Quad and Hamstring strength in the Left Lower Extremity. She is also unable to perform unsupported single leg stance on the Left Lower Extremity due to pain and Left Knee instability. Pain is constant and worsens with prolonged sitting, standing and walking.    MRI showed damage to ACL, Medial Meniscus, Lateral Meniscus, and Fracture of the Tibial Plateau. Patient has significant pain in Left Knee at all times that worsens with activity. Her Left Knee is unstable with standing and walking and especially with walking down stairs. Feel she may have suffered an ACL injury back in [facility] and this is what made the knee so unstable for so many years. The knee gave out twice back in February which is what cased the Tibial Plateau fracture. Unsure if the Meniscus tears are new or old but some of her pain inside the knee seems to be generated from the area of the Medial and Lateral Meniscus. Feel the ACL tear is contributing to her knee instability.  During ambulation, she has  decreased stance time and analgic gait on the Left.   Patient will require Physical Therapy Intervention to address all deficits and work toward completion of all goals set. Therapist will refer patient back to MD upon completion of Therapy for further assessment and possible MRI if pain and dysfunction persist.     Nely Is progressing well towards her goals.   Patient prognosis is Fair.     Patient will continue to benefit from skilled outpatient physical therapy to address the deficits listed in the problem list box on initial evaluation, provide pt/family education and to maximize pt's level of independence in the home and community environment.     Patient's spiritual, cultural and educational needs considered and pt agreeable to plan of care and goals.     Anticipated Barriers for therapy:  damage to ACL, Medial Meniscus, Lateral Meniscus, and Fracture of the Tibial Plateau; Left Knee is very unstable     Goals:  Short Term Goals: 4 weeks   1. Independent with Home Exercise Program   2. Increase Left Knee Range of Motion to 0 Degrees to 120 Degrees  3. Increase Left Knee Strength to grossly 4+/5  4. Patient will ambulate 500 feet with Normal Gait pattern with complaints of pain Less than or Equal to 4/10.       Long Term Goals: 8 weeks   1. Patient will increase Left Knee Strength to grossly 5/5  2. Patient will ambulate 1000+ feet with complaints of pain Less than or Equal to 3/10.   3. Patient will perform stair negotiation with use of handrails and reciprocal gait.   Plan     Plan of care Certification: 6/5/2024 to 8/2/2024.     Outpatient Physical Therapy 2 times weekly for 8 weeks to include the following interventions: 78138 [therapeutic exercise], 73998 [neuromuscular re-education], 78242 [gait training], 63116 [manual therapy], 54012 [therapeutic activities], and 36243 [unattended electrical stimulation]    Mark Raman PTA   6/18/2024

## 2024-06-24 ENCOUNTER — CLINICAL SUPPORT (OUTPATIENT)
Dept: REHABILITATION | Facility: HOSPITAL | Age: 54
End: 2024-06-24
Payer: COMMERCIAL

## 2024-06-24 DIAGNOSIS — R29.898 WEAKNESS OF LEFT LEG: ICD-10-CM

## 2024-06-24 DIAGNOSIS — S82.142D CLOSED FRACTURE OF LEFT TIBIAL PLATEAU WITH ROUTINE HEALING: Primary | ICD-10-CM

## 2024-06-24 PROCEDURE — 97110 THERAPEUTIC EXERCISES: CPT | Mod: CQ

## 2024-06-24 PROCEDURE — 97112 NEUROMUSCULAR REEDUCATION: CPT | Mod: CQ

## 2024-06-24 PROCEDURE — 97530 THERAPEUTIC ACTIVITIES: CPT | Mod: CQ

## 2024-06-24 NOTE — PROGRESS NOTES
[facility]   Physical Therapy Treatment Note      Name: Nely Scott  Clinic Number: [id number]  Visit Date: 6/24/2024  Therapy Diagnosis: Left Tibial Plateau Fracture   Physician: Bro Wallace MD     Physician Orders: PT Eval and Treat   Medical Diagnosis from Referral: Left Tibial Plateau Fracture   Evaluation Date: 6/5/2024  Authorization Period Expiration: 5/23/2025  Plan of Care Expiration: 8/2/2024   Visit # / Visits authorized: 4/ 60 (Hard Max)   PTA Visit #: 3/5   FOTO: 40/100     Precautions: Standard      Time In: 1:45 pm  Time Out: 2:25 pm   Total Appointment Time (timed & untimed codes): 40 minutes     Subjective     Patient reports: her knee is feeling better today due to the barometric pressure being lower  She  has been  compliant with home exercise program.  Response to previous treatment: soreness noted  Functional change: n/a    Pain: 7/10  Location: left knee      Objective      Objective Measures updated at progress report unless specified.     Treatment     Nely received the treatments listed below:      therapeutic exercises to develop strength, endurance, ROM, and flexibility for 9 minutes including:    Bike x 5 minutes   SB 3 x 20 second hold   Hamstring Stretch on Stairs 4 x 20 second hold     Supine :     Sitting :  Hamstring Curls x 4 plates x 30   Ankle Pumps     manual therapy techniques: - were applied to the: - for 0 minutes, including:    neuromuscular re-education activities to improve: Balance, Coordination, Kinesthetic, Sense, and Proprioception for 23 minutes. The following activities were included:    Cable terminal knee extension 3 plates 20x5sh  3-way Hip (B) 20x each leg  Double Leg Press, rock into TERMINAL KNEE EXTENSION 6 plates 30x  Single Leg Press, rock into TERMINAL KNEE EXTENSION 4 plates 20x  QS 20x5 sec hold  SLR 20x5 sec hold  LAQ off EOB 20x5 sec hold  Single Leg Stance - Foam     therapeutic activities to improve  functional performance for 8  minutes, including:    Standing :  Mini Squats x10  Heel Raises x 30 off step    Patient Education and Home Exercises       Education provided:    - basic knee home exercise program handout with education on 6/11/2024    Written Home Exercises Provided: Patient instructed to cont prior HEP. Exercises were reviewed and Nely was able to demonstrate them prior to the end of the session.  Nely demonstrated good  understanding of the education provided. See Electronic Medical Record under Patient Instructions for exercises provided during therapy sessions    Assessment       Pt had some increased pain with mini squats and audible crepitus. Progressed LOWER EXTREMITY strengthening with emphasis on control into TERMINAL KNEE EXTENSION. Pt demonstrates FROM in her L knee with no pain at end range flexion. Pt tolerated session well with fatigue noted by end of session. Will continue to progress as able.         PMH from evaluation:  Nely is a 54 y.o. female referred to outpatient Physical Therapy with a medical diagnosis of Left Tibial Plateau Fracture. Nely reports: the Left Knee has been unstable for years following a knee injury in [facility]. The knee would give out on her frequently.  She fell twice in February 2024 causing pain in Left Knee and increased instability. She was seen by Dr Bro Wallace, Orthopedic Surgeon here at [facility] on 2/27/2024 and an MRI was ordered. Patient followed up with MD on 3/21/2024 for MRI results. MRI was performed that showed ACL tear, tear in the Medial Meniscus, tear in the Lateral Meniscus, Bone bruising of the Tibia with questionable posterior latera tibial plateau fracture. Patient was immobilized and placed on crutches for 4 weeks and then returned to the clinic to see Dr Wallace on 4/23/204. At that time she was given an injection in the Left knee and told to continue to rest the leg to allow continued healing. When she returned to MD on  5/23/2024 X Rays showed the tibial plateau fracture is healing. MD ordered Outpatient Physical Therapy and patient is here today for the Evaluation.  She reports she still has pain in the Left knee but it varies from day to day depending upon her activity level.    Patient presents with decreased Range of Motion in the Left Knee with Flexion more limited than extension. Patient has decreased Quad and Hamstring strength in the Left Lower Extremity. She is also unable to perform unsupported single leg stance on the Left Lower Extremity due to pain and Left Knee instability. Pain is constant and worsens with prolonged sitting, standing and walking.    MRI showed damage to ACL, Medial Meniscus, Lateral Meniscus, and Fracture of the Tibial Plateau. Patient has significant pain in Left Knee at all times that worsens with activity. Her Left Knee is unstable with standing and walking and especially with walking down stairs. Feel she may have suffered an ACL injury back in [facility] and this is what made the knee so unstable for so many years. The knee gave out twice back in February which is what cased the Tibial Plateau fracture. Unsure if the Meniscus tears are new or old but some of her pain inside the knee seems to be generated from the area of the Medial and Lateral Meniscus. Feel the ACL tear is contributing to her knee instability.  During ambulation, she has decreased stance time and analgic gait on the Left.   Patient will require Physical Therapy Intervention to address all deficits and work toward completion of all goals set. Therapist will refer patient back to MD upon completion of Therapy for further assessment and possible MRI if pain and dysfunction persist.     Nely Is progressing well towards her goals.   Patient prognosis is Fair.     Patient will continue to benefit from skilled outpatient physical therapy to address the deficits listed in the problem list box on initial evaluation, provide  pt/family education and to maximize pt's level of independence in the home and community environment.     Patient's spiritual, cultural and educational needs considered and pt agreeable to plan of care and goals.     Anticipated Barriers for therapy:  damage to ACL, Medial Meniscus, Lateral Meniscus, and Fracture of the Tibial Plateau; Left Knee is very unstable     Goals:  Short Term Goals: 4 weeks   1. Independent with Home Exercise Program   2. Increase Left Knee Range of Motion to 0 Degrees to 120 Degrees  3. Increase Left Knee Strength to grossly 4+/5  4. Patient will ambulate 500 feet with Normal Gait pattern with complaints of pain Less than or Equal to 4/10.       Long Term Goals: 8 weeks   1. Patient will increase Left Knee Strength to grossly 5/5  2. Patient will ambulate 1000+ feet with complaints of pain Less than or Equal to 3/10.   3. Patient will perform stair negotiation with use of handrails and reciprocal gait.   Plan     Plan of care Certification: 6/5/2024 to 8/2/2024.     Outpatient Physical Therapy 2 times weekly for 8 weeks to include the following interventions: 93454 [therapeutic exercise], 22991 [neuromuscular re-education], 48673 [gait training], 21090 [manual therapy], 97581 [therapeutic activities], and 27388 [unattended electrical stimulation]    Jairo Bryant, SHAHZAD   6/24/2024

## 2024-06-26 ENCOUNTER — CLINICAL SUPPORT (OUTPATIENT)
Dept: REHABILITATION | Facility: HOSPITAL | Age: 54
End: 2024-06-26
Payer: COMMERCIAL

## 2024-06-26 DIAGNOSIS — S82.142D CLOSED FRACTURE OF LEFT TIBIAL PLATEAU WITH ROUTINE HEALING: Primary | ICD-10-CM

## 2024-06-26 DIAGNOSIS — R29.898 WEAKNESS OF LEFT LEG: ICD-10-CM

## 2024-06-26 PROCEDURE — 97112 NEUROMUSCULAR REEDUCATION: CPT

## 2024-06-26 PROCEDURE — 97530 THERAPEUTIC ACTIVITIES: CPT

## 2024-06-26 PROCEDURE — 97110 THERAPEUTIC EXERCISES: CPT

## 2024-06-26 NOTE — PROGRESS NOTES
[facility]   Physical Therapy Treatment Note      Name: Nely Scott  Clinic Number: [id number]  Visit Date: 6/26/2024  Therapy Diagnosis: Left Tibial Plateau Fracture   Physician: Bro Wallace MD     Physician Orders: PT Eval and Treat   Medical Diagnosis from Referral: Left Tibial Plateau Fracture   Evaluation Date: 6/5/2024  Authorization Period Expiration: 5/23/2025  Plan of Care Expiration: 8/2/2024   Visit # / Visits authorized: 4/ 60 (Hard Max)   PTA Visit #: 3/5   FOTO: 40/100     Precautions: Standard      Time In: 4:01 pm  Time Out: 4:46 pm   Total Appointment Time (timed & untimed codes): 45 minutes     Subjective     Patient reports: her knee is feeling better today due to the barometric pressure being lower  She  has been  compliant with home exercise program.  Response to previous treatment: soreness noted  Functional change: n/a    Pain: 7/10  Location: left knee      Objective      Objective Measures updated at progress report unless specified.     Treatment     Nely received the treatments listed below:      therapeutic exercises to develop strength, endurance, ROM, and flexibility for 10 minutes including:    Bike x 5 minutes   SB 3 x 20 second hold   Hamstring Stretch on Stairs 4 x 20 second hold   Knee Flexion Stretch on Stairs 4 x 20 second hold     Supine :     Sitting :  Hamstring Curls x 4 plates x 30   Ankle Pumps     manual therapy techniques: - were applied to the: - for 0 minutes, including:    neuromuscular re-education activities to improve: Balance, Coordination, Kinesthetic, Sense, and Proprioception for 25 minutes. The following activities were included:    Cable terminal knee extension 4 plates 30x5sh  3-way Hip (B) 20x each leg  Double Leg Press, rock into TERMINAL KNEE EXTENSION 6 plates 30x  Single Leg Press, rock into TERMINAL KNEE EXTENSION 4 plates 20x  QS 20x5 sec hold  SLR 20x5 sec hold  LAQ off EOB 20x5 sec hold  Single Leg Stance  "- Foam     therapeutic activities to improve functional performance for 10  minutes, including:    Standing :  Mini Squats x10  Heel Raises x 30 off step  Lateral Step Downs with 2" step x 10     Patient Education and Home Exercises       Education provided:    - basic knee home exercise program handout with education on 6/11/2024    Written Home Exercises Provided: Patient instructed to cont prior HEP. Exercises were reviewed and Nely was able to demonstrate them prior to the end of the session.  Nely demonstrated good  understanding of the education provided. See Electronic Medical Record under Patient Instructions for exercises provided during therapy sessions    Assessment     Patient continues to have feelings of instability in the Left Knee. Therapist is working with patient on closed chain strengthening exercises to increase quad strength. We are also focusing on eccentric quad strengthening and therapist added step downs from a 2 inch step today. She did well but the knee fatigues very quickly. She does have crepitus in the Left knee with compressive activities. Left knee remains dysfunctional. We will continue to strengthen her as able. Sup Visit performed today with STEPHAN Estrada and STEPHAN Thomas.  All goals and treatment plan reviewed. Will work toward completion of all goals set.            PMH from evaluation:  Nely is a 54 y.o. female referred to outpatient Physical Therapy with a medical diagnosis of Left Tibial Plateau Fracture. Nely reports: the Left Knee has been unstable for years [facility] The knee would give out on her frequently.  She fell twice in February 2024 causing pain in Left Knee and increased instability. She was seen by Dr Bro Wallace, Orthopedic Surgeon here at [facility] on 2/27/2024 and an MRI was ordered. Patient followed up with MD on 3/21/2024 for MRI results. MRI was performed that showed ACL tear, tear in the Medial " Meniscus, tear in the Lateral Meniscus, Bone bruising of the Tibia with questionable posterior latera tibial plateau fracture. Patient was immobilized and placed on crutches for 4 weeks and then returned to the clinic to see Dr Wallace on 4/23/204. At that time she was given an injection in the Left knee and told to continue to rest the leg to allow continued healing. When she returned to MD on 5/23/2024 X Rays showed the tibial plateau fracture is healing. MD ordered Outpatient Physical Therapy and patient is here today for the Evaluation.  She reports she still has pain in the Left knee but it varies from day to day depending upon her activity level.    Patient presents with decreased Range of Motion in the Left Knee with Flexion more limited than extension. Patient has decreased Quad and Hamstring strength in the Left Lower Extremity. She is also unable to perform unsupported single leg stance on the Left Lower Extremity due to pain and Left Knee instability. Pain is constant and worsens with prolonged sitting, standing and walking.    MRI showed damage to ACL, Medial Meniscus, Lateral Meniscus, and Fracture of the Tibial Plateau. Patient has significant pain in Left Knee at all times that worsens with activity. Her Left Knee is unstable with standing and walking and especially with walking down stairs. Feel she may have suffered an ACL injury back in [facility] and this is what made the knee so unstable for so many years. The knee gave out twice back in February which is what cased the Tibial Plateau fracture. Unsure if the Meniscus tears are new or old but some of her pain inside the knee seems to be generated from the area of the Medial and Lateral Meniscus. Feel the ACL tear is contributing to her knee instability.  During ambulation, she has decreased stance time and analgic gait on the Left.   Patient will require Physical Therapy Intervention to address all deficits and work toward completion of all goals  set. Therapist will refer patient back to MD upon completion of Therapy for further assessment and possible MRI if pain and dysfunction persist.     Nely Is progressing well towards her goals.   Patient prognosis is Fair.     Patient will continue to benefit from skilled outpatient physical therapy to address the deficits listed in the problem list box on initial evaluation, provide pt/family education and to maximize pt's level of independence in the home and community environment.     Patient's spiritual, cultural and educational needs considered and pt agreeable to plan of care and goals.     Anticipated Barriers for therapy:  damage to ACL, Medial Meniscus, Lateral Meniscus, and Fracture of the Tibial Plateau; Left Knee is very unstable     Goals:  Short Term Goals: 4 weeks   1. Independent with Home Exercise Program   2. Increase Left Knee Range of Motion to 0 Degrees to 120 Degrees  3. Increase Left Knee Strength to grossly 4+/5  4. Patient will ambulate 500 feet with Normal Gait pattern with complaints of pain Less than or Equal to 4/10.       Long Term Goals: 8 weeks   1. Patient will increase Left Knee Strength to grossly 5/5  2. Patient will ambulate 1000+ feet with complaints of pain Less than or Equal to 3/10.   3. Patient will perform stair negotiation with use of handrails and reciprocal gait.   Plan     Plan of care Certification: 6/5/2024 to 8/2/2024.     Outpatient Physical Therapy 2 times weekly for 8 weeks to include the following interventions: 56521 [therapeutic exercise], 33568 [neuromuscular re-education], 14291 [gait training], 96101 [manual therapy], 85579 [therapeutic activities], and 96223 [unattended electrical stimulation]    KARTHIK SHAVER, PT   6/26/2024

## 2024-07-15 ENCOUNTER — CLINICAL SUPPORT (OUTPATIENT)
Dept: REHABILITATION | Facility: HOSPITAL | Age: 54
End: 2024-07-15
Payer: COMMERCIAL

## 2024-07-15 DIAGNOSIS — S82.142D CLOSED FRACTURE OF LEFT TIBIAL PLATEAU WITH ROUTINE HEALING: Primary | ICD-10-CM

## 2024-07-15 DIAGNOSIS — R29.898 WEAKNESS OF LEFT LEG: ICD-10-CM

## 2024-07-15 PROCEDURE — 97016 VASOPNEUMATIC DEVICE THERAPY: CPT

## 2024-07-15 PROCEDURE — 97140 MANUAL THERAPY 1/> REGIONS: CPT

## 2024-07-15 PROCEDURE — 97110 THERAPEUTIC EXERCISES: CPT

## 2024-07-15 NOTE — PROGRESS NOTES
[facility]   Physical Therapy Treatment Note      Name: Nely Gallego Tyler  Clinic Number: [id number]  Visit Date: 7/15/2024  Therapy Diagnosis: Left Tibial Plateau Fracture   Physician: Bro Wallace MD     Physician Orders: PT Eval and Treat   Medical Diagnosis from Referral: Left Tibial Plateau Fracture   Evaluation Date: 6/5/2024  Authorization Period Expiration: 5/23/2025  Plan of Care Expiration: 8/2/2024   Visit # / Visits authorized: 7 / 60 (Hard Max)   PTA Visit #: 1/5   FOTO: 40/100     Precautions: Standard      Time In: 3:58 pm  Time Out: *** pm   Total Appointment Time (timed & untimed codes): *** minutes     Subjective     Patient reports: she fell down some steps on July 4th when her right knee gave out on her. Since then, she has had increased pain in both knees.  The left knee remains very unstable. She is going to call Dr. Crabtree's office to get an appt.   She  has been  compliant with home exercise program.  Response to previous treatment: soreness noted  Functional change: n/a    Pain: 7/10  Location: left knee      Objective      Objective Measures updated at progress report unless specified.     Treatment     Nely received the treatments listed below:      therapeutic exercises to develop strength, endurance, ROM, and flexibility for 10 minutes including:    Bike x 5 minutes   SB 3 x 20 second hold   Hamstring Stretch on Stairs 4 x 20 second hold   Knee Flexion Stretch on Stairs 4 x 20 second hold     Supine :     Sitting :  Hamstring Curls x 4 plates x 30   Ankle Pumps     manual therapy techniques: - were applied to the: - for 0 minutes, including:    neuromuscular re-education activities to improve: Balance, Coordination, Kinesthetic, Sense, and Proprioception for 25 minutes. The following activities were included:    Cable terminal knee extension 4 plates 30x5sh  3-way Hip (B) 20x each leg  Double Leg Press, rock into TERMINAL KNEE EXTENSION 6 plates  "30x  Single Leg Press, rock into TERMINAL KNEE EXTENSION 4 plates 20x  QS 20x5 sec hold  SLR 20x5 sec hold  LAQ off EOB 20x5 sec hold  Single Leg Stance - Foam     therapeutic activities to improve functional performance for 10 minutes, including:    Standing :  Mini Squats x10  Heel Raises x 30 off step  Lateral Step Downs with 2" step x 10     Patient Education and Home Exercises       Education provided:    - basic knee home exercise program handout with education on 6/11/2024.     Written Home Exercises Provided: Patient instructed to cont prior HEP. Exercises were reviewed and Nely was able to demonstrate them prior to the end of the session.  Nely demonstrated good  understanding of the education provided. See Electronic Medical Record under Patient Instructions for exercises provided during therapy sessions    Assessment     Patient continues to have feelings of instability in the Left Knee. Therapist is working with patient on closed chain strengthening exercises to increase quad strength. We are also focusing on eccentric quad strengthening and therapist added step downs from a 2 inch step today. She did well but the knee fatigues very quickly. She does have crepitus in the Left knee with compressive activities. Left knee remains dysfunctional. We will continue to strengthen her as able.       PMH from evaluation:  Nely is a 54 y.o. female referred to outpatient Physical Therapy with a medical diagnosis of Left Tibial Plateau Fracture. Nely reports: the Left Knee has been unstable for years following a knee injury in [facility]. The knee would give out on her frequently.  She fell twice in February 2024 causing pain in Left Knee and increased instability. She was seen by Dr Bro Wallace, Orthopedic Surgeon here at [facility] on 2/27/2024 and an MRI was ordered. Patient followed up with MD on 3/21/2024 for MRI results. MRI was performed that showed ACL tear, tear in the Medial Meniscus, tear " in the Lateral Meniscus, Bone bruising of the Tibia with questionable posterior latera tibial plateau fracture. Patient was immobilized and placed on crutches for 4 weeks and then returned to the clinic to see Dr Wallace on 4/23/204. At that time she was given an injection in the Left knee and told to continue to rest the leg to allow continued healing. When she returned to MD on 5/23/2024 X Rays showed the tibial plateau fracture is healing. MD ordered Outpatient Physical Therapy and patient is here today for the Evaluation.  She reports she still has pain in the Left knee but it varies from day to day depending upon her activity level.    Patient presents with decreased Range of Motion in the Left Knee with Flexion more limited than extension. Patient has decreased Quad and Hamstring strength in the Left Lower Extremity. She is also unable to perform unsupported single leg stance on the Left Lower Extremity due to pain and Left Knee instability. Pain is constant and worsens with prolonged sitting, standing and walking.    MRI showed damage to ACL, Medial Meniscus, Lateral Meniscus, and Fracture of the Tibial Plateau. Patient has significant pain in Left Knee at all times that worsens with activity. Her Left Knee is unstable with standing and walking and especially with walking down stairs. Feel she may have suffered an ACL injury back in [facility] and this is what made the knee so unstable for so many years. The knee gave out twice back in February which is what cased the Tibial Plateau fracture. Unsure if the Meniscus tears are new or old but some of her pain inside the knee seems to be generated from the area of the Medial and Lateral Meniscus. Feel the ACL tear is contributing to her knee instability.  During ambulation, she has decreased stance time and analgic gait on the Left.   Patient will require Physical Therapy Intervention to address all deficits and work toward completion of all goals set. Therapist  will refer patient back to MD upon completion of Therapy for further assessment and possible MRI if pain and dysfunction persist.     Nely Is progressing well towards her goals.   Patient prognosis is Fair.     Patient will continue to benefit from skilled outpatient physical therapy to address the deficits listed in the problem list box on initial evaluation, provide pt/family education and to maximize pt's level of independence in the home and community environment.     Patient's spiritual, cultural and educational needs considered and pt agreeable to plan of care and goals.     Anticipated Barriers for therapy:  damage to ACL, Medial Meniscus, Lateral Meniscus, and Fracture of the Tibial Plateau; Left Knee is very unstable     Goals:  Short Term Goals: 4 weeks   1. Independent with Home Exercise Program   2. Increase Left Knee Range of Motion to 0 Degrees to 120 Degrees  3. Increase Left Knee Strength to grossly 4+/5  4. Patient will ambulate 500 feet with Normal Gait pattern with complaints of pain Less than or Equal to 4/10.       Long Term Goals: 8 weeks   1. Patient will increase Left Knee Strength to grossly 5/5  2. Patient will ambulate 1000+ feet with complaints of pain Less than or Equal to 3/10.   3. Patient will perform stair negotiation with use of handrails and reciprocal gait.   Plan     Plan of care Certification: 6/5/2024 to 8/2/2024.     Outpatient Physical Therapy 2 times weekly for 8 weeks to include the following interventions: 25401 [therapeutic exercise], 38637 [neuromuscular re-education], 35757 [gait training], 59294 [manual therapy], 61875 [therapeutic activities], and 36318 [unattended electrical stimulation]    Mark Raman PTA   7/15/2024

## 2024-07-15 NOTE — PROGRESS NOTES
[facility]   Physical Therapy Treatment Note      Name: Nely Scott  Clinic Number: [id number]  Visit Date: 7/15/2024  Therapy Diagnosis: Left Tibial Plateau Fracture   Physician: Bro Wallace MD     Physician Orders: PT Eval and Treat   Medical Diagnosis from Referral: Left Tibial Plateau Fracture   Evaluation Date: 6/5/2024  Authorization Period Expiration: 5/23/2025  Plan of Care Expiration: 8/2/2024   Visit # / Visits authorized: 7/ 60 (Hard Max)   PTA Visit #: 3/5   FOTO: 40/100     Precautions: Standard      Time In: 3:56 pm  Time Out: 4:44 pm   Total Appointment Time (timed & untimed codes): 48 minutes     Subjective     Patient reports[place] and [place] are very painful today  She  has been  compliant with home exercise program.  Response to previous treatment: soreness noted  Functional change: n/a    Pain: 7/10 Right Knee; 9/10 Left Knee  Location: left knee      Objective      Objective Measures updated at progress report unless specified.     Treatment     Nely received the treatments listed below:      therapeutic exercises to develop strength, endurance, ROM, and flexibility for 8 minutes including:    Bike x 5 minutes   SB 3 x 20 second hold   Hamstring Stretch on Stairs 4 x 20 second hold   Knee Flexion Stretch on Stairs 4 x 20 second hold     Supine :     Sitting :  Hamstring Curls x 4 plates x 30   Ankle Pumps     manual therapy techniques: - were applied to the: - for 25 minutes, including:    Re-Assessment of Bilateral Knees due to the fall   She is tender to palpation along medial and lateral joint lines   Knee Special Tests Performed   Lochman's test: right Negative left Positive  Anterior drawer: right Negative left Positive  Posterior drawer: right Negative left Negative  Varus stress test: right Negative left Negative  Valgus stress test: right Negative left Negative  PFJ grind test: right Positive left  "Positive  McMurrays: right Positive left Positive  Thessaly's: right Positive left Positive      neuromuscular re-education activities to improve: Balance, Coordination, Kinesthetic, Sense, and Proprioception for 0 minutes. The following activities were included:    Cable terminal knee extension 4 plates 30x5sh  3-way Hip (B) 20x each leg  Double Leg Press, rock into TERMINAL KNEE EXTENSION 6 plates 30x  Single Leg Press, rock into TERMINAL KNEE EXTENSION 4 plates 20x  QS 20x5 sec hold  SLR 20x5 sec hold  LAQ off EOB 20x5 sec hold  Single Leg Stance - Foam     therapeutic activities to improve functional performance for 0  minutes, including:    Standing :  Mini Squats x10  Heel Raises x 30 off step  Lateral Step Downs with 2" step x 10   Patient received Game Ready x 15 Minutes to Left Knee  Patient received Cold Pack x 15 minutes to Right Knee    Patient Education and Home Exercises       Education provided:    - basic knee home exercise program handout with education on 6/11/2024    Written Home Exercises Provided: Patient instructed to cont prior HEP. Exercises were reviewed and Nely was able to demonstrate them prior to the end of the session.  Nely demonstrated good  understanding of the education provided. See Electronic Medical Record under Patient Instructions for exercises provided during therapy sessions    Assessment     Patient returns to Therapy today after being out of town for 2 weeks for a vacation and then the death of her father-in-law. She reports that while she was in the mountains on [place], there was a very tall step that she had to go up and down every day. Her Left knee had been so painful that she was deviating a lot of her weight to the Right leg and her Right leg became fatigued. She was going down the step last week and the Right Knee gave out on her and she fell down the steps. This fall caused increased pain to both the Right and the Left Knee. She reports her pain is 9/10 on " the Left and 7/10 on the Right. Therapist performed special testing/Re-Assessment of bilateral knees today. She was positive for Thessaly's test on bilateral knees. She has known medial and lateral meniscus tears on the Left but it is possible that she now has a meniscus tear on the Right. Unable to determine for sure today. She has increased pain when trying to fully extend both knees and she is unable to keep her knees in extension. Therapist performed minimal treatment today with most of the focus being on re-assessment. Ended session with Game Ready/Vasopneumatic pump to Left Knee and cold pack to Right Knee to help decrease pain and inflammation as able. Therapist is referring her back to Ortho MD, Dr Bro Wallace. She will try to make an appointment with him for this week. However, she is going out of town again next week so if she cannot see Dr Bro Wallace this week she plans to follow up with him on the week of 7/29. She will return to Therapy on 7/29 for one last treatment. This will complete her 6 weeks with Physical Therapy. At that time we plan to discharge her from Therapy with plan to discuss possible surgical intervention with the Orthopedic Surgeon as her pain and dysfunction have actually continued to worsen rather than get better with therapy and increased activity.                 PMH from evaluation:  Nely is a 54 y.o. female referred to outpatient Physical Therapy with a medical diagnosis of Left Tibial Plateau Fracture. Nely reports: the Left Knee has been unstable for years [place] The knee would give out on her frequently.  She fell twice in February 2024 causing pain in Left Knee and increased instability. She was seen by Dr Bro Wallace, Orthopedic Surgeon here at [facility] on 2/27/2024 and an MRI was ordered. Patient followed up with MD on 3/21/2024 for MRI results. MRI was performed that showed ACL tear, tear in the Medial Meniscus, tear in the Lateral  Meniscus, Bone bruising of the Tibia with questionable posterior latera tibial plateau fracture. Patient was immobilized and placed on crutches for 4 weeks and then returned to the clinic to see Dr Wallace on 4/23/204. At that time she was given an injection in the Left knee and told to continue to rest the leg to allow continued healing. When she returned to MD on 5/23/2024 X Rays showed the tibial plateau fracture is healing. MD ordered Outpatient Physical Therapy and patient is here today for the Evaluation.  She reports she still has pain in the Left knee but it varies from day to day depending upon her activity level.    Patient presents with decreased Range of Motion in the Left Knee with Flexion more limited than extension. Patient has decreased Quad and Hamstring strength in the Left Lower Extremity. She is also unable to perform unsupported single leg stance on the Left Lower Extremity due to pain and Left Knee instability. Pain is constant and worsens with prolonged sitting, standing and walking.    MRI showed damage to ACL, Medial Meniscus, Lateral Meniscus, and Fracture of the Tibial Plateau. Patient has significant pain in Left Knee at all times that worsens with activity. Her Left Knee is unstable with standing and walking and especially with walking down stairs. Feel she may have suffered an ACL injury back in [facility] and this is what made the knee so unstable for so many years. The knee gave out twice back in February which is what cased the Tibial Plateau fracture. Unsure if the Meniscus tears are new or old but some of her pain inside the knee seems to be generated from the area of the Medial and Lateral Meniscus. Feel the ACL tear is contributing to her knee instability.  During ambulation, she has decreased stance time and analgic gait on the Left.   Patient will require Physical Therapy Intervention to address all deficits and work toward completion of all goals set. Therapist will refer patient  back to MD upon completion of Therapy for further assessment and possible MRI if pain and dysfunction persist.     Nely Is progressing well towards her goals.   Patient prognosis is Fair.     Patient will continue to benefit from skilled outpatient physical therapy to address the deficits listed in the problem list box on initial evaluation, provide pt/family education and to maximize pt's level of independence in the home and community environment.     Patient's spiritual, cultural and educational needs considered and pt agreeable to plan of care and goals.     Anticipated Barriers for therapy:  damage to ACL, Medial Meniscus, Lateral Meniscus, and Fracture of the Tibial Plateau; Left Knee is very unstable     Goals:  Short Term Goals: 4 weeks   1. Independent with Home Exercise Program   2. Increase Left Knee Range of Motion to 0 Degrees to 120 Degrees  3. Increase Left Knee Strength to grossly 4+/5  4. Patient will ambulate 500 feet with Normal Gait pattern with complaints of pain Less than or Equal to 4/10.       Long Term Goals: 8 weeks   1. Patient will increase Left Knee Strength to grossly 5/5  2. Patient will ambulate 1000+ feet with complaints of pain Less than or Equal to 3/10.   3. Patient will perform stair negotiation with use of handrails and reciprocal gait.   Plan     Plan of care Certification: 6/5/2024 to 8/2/2024.     Outpatient Physical Therapy 2 times weekly for 8 weeks to include the following interventions: 36451 [therapeutic exercise], 82288 [neuromuscular re-education], 51287 [gait training], 06071 [manual therapy], 12524 [therapeutic activities], and 18349 [unattended electrical stimulation]    KARTHIK SHAVER, PT, DPT   7/15/2024

## 2024-07-16 ENCOUNTER — TELEPHONE (OUTPATIENT)
Dept: ORTHOPEDICS | Facility: CLINIC | Age: 54
End: 2024-07-16
Payer: COMMERCIAL

## 2024-07-16 NOTE — TELEPHONE ENCOUNTER
PATIENT SCHEDULED FOR FOLLOW- UP WITH DR. SMILEY 7/18      ----- Message from Nereyda Chung sent at 7/16/2024  8:16 AM CDT -----  Pt is in PT for left knee. Right knee buckled on her and is asking if she needs xray/MRI. Both knees are hurting. Call [phone].   Who Called: Nely Scott    Caller is requesting assistance/information from provider's office.    Symptoms (please be specific): Knees hurting   How long has patient had these symptoms:    List of preferred pharmacies on file (remove unneeded): [unfilled]  If different, enter pharmacy into here including location and phone number:       Preferred Method of Contact: Phone Call  Patient's Preferred Phone Number on File: [phone]   Best Call Back Number, if different:  Additional Information:

## 2024-07-16 NOTE — TELEPHONE ENCOUNTER
----- Message from Nereyda Chung sent at 7/16/2024  8:16 AM CDT -----  Pt is in PT for left knee. Right knee buckled on her and is asking if she needs xray/MRI. Both knees are hurting. Call [phone].   Who Called: Nely Scott    Caller is requesting assistance/information from provider's office.    Symptoms (please be specific): Knees hurting   How long has patient had these symptoms:    List of preferred pharmacies on file (remove unneeded): [unfilled]  If different, enter pharmacy into here including location and phone number:       Preferred Method of Contact: Phone Call  Patient's Preferred Phone Number on File: [phone]   Best Call Back Number, if different:  Additional Information:

## 2024-07-17 DIAGNOSIS — S82.142D CLOSED FRACTURE OF POSTERIOR ASPECT OF LEFT TIBIAL PLATEAU WITH ROUTINE HEALING, SUBSEQUENT ENCOUNTER: Primary | ICD-10-CM

## 2024-07-17 DIAGNOSIS — M25.561 RIGHT KNEE PAIN, UNSPECIFIED CHRONICITY: ICD-10-CM

## 2024-07-18 ENCOUNTER — HOSPITAL ENCOUNTER (OUTPATIENT)
Dept: RADIOLOGY | Facility: HOSPITAL | Age: 54
Discharge: HOME | End: 2024-07-18
Attending: ORTHOPAEDIC SURGERY
Payer: COMMERCIAL

## 2024-07-18 ENCOUNTER — OFFICE VISIT (OUTPATIENT)
Dept: ORTHOPEDICS | Facility: CLINIC | Age: 54
End: 2024-07-18
Payer: COMMERCIAL

## 2024-07-18 ENCOUNTER — CLINICAL SUPPORT (OUTPATIENT)
Dept: REHABILITATION | Facility: HOSPITAL | Age: 54
End: 2024-07-18
Payer: COMMERCIAL

## 2024-07-18 DIAGNOSIS — M17.12 PRIMARY OSTEOARTHRITIS OF LEFT KNEE: ICD-10-CM

## 2024-07-18 DIAGNOSIS — R29.898 WEAKNESS OF LEFT LEG: ICD-10-CM

## 2024-07-18 DIAGNOSIS — S82.142D CLOSED FRACTURE OF POSTERIOR ASPECT OF LEFT TIBIAL PLATEAU WITH ROUTINE HEALING, SUBSEQUENT ENCOUNTER: ICD-10-CM

## 2024-07-18 DIAGNOSIS — M25.561 RIGHT KNEE PAIN, UNSPECIFIED CHRONICITY: ICD-10-CM

## 2024-07-18 DIAGNOSIS — S82.142D CLOSED FRACTURE OF LEFT TIBIAL PLATEAU WITH ROUTINE HEALING: Primary | ICD-10-CM

## 2024-07-18 DIAGNOSIS — S82.142D CLOSED FRACTURE OF POSTERIOR ASPECT OF LEFT TIBIAL PLATEAU WITH ROUTINE HEALING, SUBSEQUENT ENCOUNTER: Primary | ICD-10-CM

## 2024-07-18 PROCEDURE — 73564 X-RAY EXAM KNEE 4 OR MORE: CPT | Mod: TC,50

## 2024-07-18 PROCEDURE — 97110 THERAPEUTIC EXERCISES: CPT

## 2024-07-18 PROCEDURE — 97016 VASOPNEUMATIC DEVICE THERAPY: CPT

## 2024-07-18 PROCEDURE — 1160F RVW MEDS BY RX/DR IN RCRD: CPT | Mod: ,,, | Performed by: ORTHOPAEDIC SURGERY

## 2024-07-18 PROCEDURE — 97530 THERAPEUTIC ACTIVITIES: CPT

## 2024-07-18 PROCEDURE — 99999 PR PBB SHADOW E&M-EST. PATIENT-LVL III: CPT | Mod: PBBFAC,,, | Performed by: ORTHOPAEDIC SURGERY

## 2024-07-18 PROCEDURE — 99213 OFFICE O/P EST LOW 20 MIN: CPT | Mod: S$PBB,,, | Performed by: ORTHOPAEDIC SURGERY

## 2024-07-18 PROCEDURE — 99213 OFFICE O/P EST LOW 20 MIN: CPT | Mod: PBBFAC,25 | Performed by: ORTHOPAEDIC SURGERY

## 2024-07-18 PROCEDURE — 1159F MED LIST DOCD IN RCRD: CPT | Mod: ,,, | Performed by: ORTHOPAEDIC SURGERY

## 2024-07-18 NOTE — PROGRESS NOTES
[facility]   Physical Therapy Treatment Note      Name: Nely Scott  Clinic Number: [id number]  Visit Date: 7/18/2024  Therapy Diagnosis: Left Tibial Plateau Fracture   Physician: Bro Wallace MD     Physician Orders: PT Eval and Treat   Medical Diagnosis from Referral: Left Tibial Plateau Fracture   Evaluation Date: 6/5/2024  Authorization Period Expiration: 5/23/2025  Plan of Care Expiration: 9/13/2024   Visit # / Visits authorized: 8/ 60 (Hard Max)   PTA Visit #: 0/5   FOTO: 40/100     Precautions: Standard      Time In: 3:58 pm  Time Out: 4:45 pm   Total Appointment Time (timed & untimed codes): 47 minutes     Subjective     Patient reports: Left Knee remains painful but Right knee is starting to feel a little better   She  has been  compliant with home exercise program.  Response to previous treatment: soreness noted  Functional change: n/a    Pain: 4/10 Right Knee; 7/10 Left Knee  Location: left knee      Objective      Objective Measures updated at progress report unless specified.     Treatment     Nely received the treatments listed below:      therapeutic exercises to develop strength, endurance, ROM, and flexibility for 15 minutes including:    Bike x 5 minutes   SB 3 x 20 second hold   Hamstring Stretch on Stairs 4 x 20 second hold   Knee Flexion Stretch on Stairs 4 x 20 second hold     Supine :   Quad Sets x 20  Heel Slides x 20  Short Arc Quads x 20   Straight Leg Raises x 20     Sitting :  Hamstring Curls x 4 plates x 30   Ankle Pumps     manual therapy techniques: - were applied to the: - for 0 minutes, including:      neuromuscular re-education activities to improve: Balance, Coordination, Kinesthetic, Sense, and Proprioception for 0 minutes. The following activities were included:    Cable terminal knee extension 4 plates 30x5sh  3-way Hip (B) 20x each leg  Double Leg Press, rock into TERMINAL KNEE EXTENSION 6 plates 30x  Single Leg Press, rock into  "TERMINAL KNEE EXTENSION 4 plates 20x      SLR 20x5 sec hold  LAQ off EOB 20x5 sec hold  Single Leg Stance - Foam     therapeutic activities to improve functional performance for 17  minutes, including:    Standing :  Mini Squats x10  Heel Raises x 30 off step  Marching at rail 2 x 10   Hip Abduction at rail 2 x 10   Hamstring curls at rail 2 x 10   Lateral Step Downs with 2" step x 10   Patient received Game Ready x 15 Minutes to Left Knee  Patient received Cold Pack x 15 minutes to Right Knee    Patient Education and Home Exercises       Education provided:    - basic knee home exercise program handout with education on 6/11/2024    Written Home Exercises Provided: Patient instructed to cont prior HEP. Exercises were reviewed and Nely was able to demonstrate them prior to the end of the session.  Nely demonstrated good  understanding of the education provided. See Electronic Medical Record under Patient Instructions for exercises provided during therapy sessions    Assessment     At last therapy visit patient reported to the therapist that she had a fall while on vacation which caused increased pain in both knees. Patient reports her Right knee is a little better today but her Left Knee is still painful. She has been swimming at home which has helped some. She did get in to see Dr Bro Wallace today. His note is not yet complete but per patient he performed an X Ray which showed arthritis in the Right Knee. He is planning on doing visco and PRP injections in both knees to help with the pain and inflammation she is having. Still unsure if the Orthopedic surgeon is planning on doing surgery for the partial tear to the ACL and the medial and lateral meniscus tears.  Therapist had patient perform gentle exercises today as pain would allow. Therapist ended session with Game Ready to Left Knee and cold pack to Right Knee. Patient has had to miss a good portion of therapy due to being out of town for several weeks " and due to a death in the family. Therapist is performing an Updated Plan of Care today to extend her dates for treatment due to the recent fall and her missing so many therapy appointments.                       PMH from evaluation:  Nely is a 54 y.o. female referred to outpatient Physical Therapy with a medical diagnosis of Left Tibial Plateau Fracture. Nely reports: the Left Knee has been unstable for years following [place] [employer] [place] [employer] The knee would give out on her frequently.  She fell twice in February 2024 causing pain in Left Knee and increased instability. She was seen by Dr Bro Wallace, Orthopedic Surgeon here at [facility] on 2/27/2024 and an MRI was ordered. Patient followed up with MD on 3/21/2024 for MRI results. MRI was performed that showed ACL tear, tear in the Medial Meniscus, tear in the Lateral Meniscus, Bone bruising of the Tibia with questionable posterior latera tibial plateau fracture. Patient was immobilized and placed on crutches for 4 weeks and then returned to the clinic to see Dr Wallace on 4/23/204. At that time she was given an injection in the Left knee and told to continue to rest the leg to allow continued healing. When she returned to MD on 5/23/2024 X Rays showed the tibial plateau fracture is healing. MD ordered Outpatient Physical Therapy and patient is here today for the Evaluation.  She reports she still has pain in the Left knee but it varies from day to day depending upon her activity level.    Patient presents with decreased Range of Motion in the Left Knee with Flexion more limited than extension. Patient has decreased Quad and Hamstring strength in the Left Lower Extremity. She is also unable to perform unsupported single leg stance on the Left Lower Extremity due to pain and Left Knee instability. Pain is constant and worsens with prolonged sitting, standing and walking.    MRI showed damage to ACL, Medial Meniscus, Lateral Meniscus, and Fracture of the  Tibial Plateau. Patient has significant pain in Left Knee at all times that worsens with activity. Her Left Knee is unstable with standing and walking and especially with walking down stairs. Feel she may have suffered an ACL injury back in [facility] and this is what made the knee so unstable for so many years. The knee gave out twice back in February which is what cased the Tibial Plateau fracture. Unsure if the Meniscus tears are new or old but some of her pain inside the knee seems to be generated from the area of the Medial and Lateral Meniscus. Feel the ACL tear is contributing to her knee instability.  During ambulation, she has decreased stance time and analgic gait on the Left.   Patient will require Physical Therapy Intervention to address all deficits and work toward completion of all goals set. Therapist will refer patient back to MD upon completion of Therapy for further assessment and possible MRI if pain and dysfunction persist.     Nely Is progressing well towards her goals.   Patient prognosis is Fair.     Patient will continue to benefit from skilled outpatient physical therapy to address the deficits listed in the problem list box on initial evaluation, provide pt/family education and to maximize pt's level of independence in the home and community environment.     Patient's spiritual, cultural and educational needs considered and pt agreeable to plan of care and goals.     Anticipated Barriers for therapy:  damage to ACL, Medial Meniscus, Lateral Meniscus, and Fracture of the Tibial Plateau; Left Knee is very unstable     Goals:  Short Term Goals: 4 weeks   1. Independent with Home Exercise Program - Goal Met   2. Increase Left Knee Range of Motion to 0 Degrees to 120 Degrees - Goal Not Met   3. Increase Left Knee Strength to grossly 4+/5  - Goal Not Met   4. Patient will ambulate 500 feet with Normal Gait pattern with complaints of pain Less than or Equal to 4/10.   - Goal Not Met      Long  Term Goals: 8 weeks   1. Patient will increase Left Knee Strength to grossly 5/5  - Goal Not Met   2. Patient will ambulate 1000+ feet with complaints of pain Less than or Equal to 3/10.  - Goal Not Met   3. Patient will perform stair negotiation with use of handrails and reciprocal gait.  - Goal Not Met     Plan     Updated Plan of care Certification: 7/18/2024 to 9/13/2024.     Outpatient Physical Therapy 2 times weekly for 8 weeks to include the following interventions: 50524 [aquatics], 27650 [therapeutic exercise], 99312 [neuromuscular re-education], 26439 [gait training], 89470 [manual therapy], 76159 [therapeutic activities], and 35571 [unattended electrical stimulation]      KARTHIK SHAVER, PT, DPT   7/18/2024

## 2024-07-18 NOTE — PLAN OF CARE
[facility]   Physical Therapy Updated Plan of Care      Name: Nely Scott  Clinic Number: [id number]  Visit Date: 7/18/2024  Therapy Diagnosis: Left Tibial Plateau Fracture   Physician: Bro Wallace MD     Physician Orders: PT Eval and Treat   Medical Diagnosis from Referral: Left Tibial Plateau Fracture   Evaluation Date: 6/5/2024  Authorization Period Expiration: 5/23/2025  Plan of Care Expiration: 9/13/2024   Visit # / Visits authorized: 8/ 60 (Hard Max)   PTA Visit #: 0/5   FOTO: 40/100     Precautions: Standard      Time In: 3:58 pm  Time Out: 4:45 pm   Total Appointment Time (timed & untimed codes): 47 minutes     Subjective     Patient reports: Left Knee remains painful but Right knee is starting to feel a little better   She has been compliant with home exercise program.  Response to previous treatment: soreness noted  Functional change: n/a    Pain: 4/10 Right Knee; 7/10 Left Knee  Location: left knee      Objective      Objective Measures updated at progress report unless specified.     Treatment     Nely received the treatments listed below:      therapeutic exercises to develop strength, endurance, ROM, and flexibility for 15 minutes including:    Bike x 5 minutes   SB 3 x 20 second hold   Hamstring Stretch on Stairs 4 x 20 second hold   Knee Flexion Stretch on Stairs 4 x 20 second hold     Supine :   Quad Sets x 20  Heel Slides x 20  Short Arc Quads x 20   Straight Leg Raises x 20     Sitting :  Hamstring Curls x 4 plates x 30   Ankle Pumps     manual therapy techniques: - were applied to the: - for 0 minutes, including:      neuromuscular re-education activities to improve: Balance, Coordination, Kinesthetic, Sense, and Proprioception for 0 minutes. The following activities were included:    Cable terminal knee extension 4 plates 30x5sh  3-way Hip (B) 20x each leg  Double Leg Press, rock into TERMINAL KNEE EXTENSION 6 plates 30x  Single Leg Press, rock into  "TERMINAL KNEE EXTENSION 4 plates 20x      SLR 20x5 sec hold  LAQ off EOB 20x5 sec hold  Single Leg Stance - Foam     therapeutic activities to improve functional performance for 17  minutes, including:    Standing :  Mini Squats x10  Heel Raises x 30 off step  Marching at rail 2 x 10   Hip Abduction at rail 2 x 10   Hamstring curls at rail 2 x 10   Lateral Step Downs with 2" step x 10   Patient received Game Ready x 15 Minutes to Left Knee  Patient received Cold Pack x 15 minutes to Right Knee    Patient Education and Home Exercises       Education provided:    - basic knee home exercise program handout with education on 6/11/2024    Written Home Exercises Provided: Patient instructed to cont prior HEP. Exercises were reviewed and Nely was able to demonstrate them prior to the end of the session.  Nely demonstrated good  understanding of the education provided. See Electronic Medical Record under Patient Instructions for exercises provided during therapy sessions    Assessment     At last therapy visit patient reported to the therapist that she had a fall while on vacation which caused increased pain in both knees. Patient reports her Right knee is a little better today but her Left Knee is still painful. She has been swimming at home which has helped some. She did get in to see Dr Bro Wallace today. His note is not yet complete but per patient he performed an X Ray which showed arthritis in the Right Knee. He is planning on doing visco and PRP injections in both knees to help with the pain and inflammation she is having. Still unsure if the Orthopedic surgeon is planning on doing surgery for the partial tear to the ACL and the medial and lateral meniscus tears.  Therapist had patient perform gentle exercises today as pain would allow. Therapist ended session with Game Ready to Left Knee and cold pack to Right Knee. Patient has had to miss a good portion of therapy due to being out of town for several weeks " and due to a death in the family. Therapist is performing an Updated Plan of Care today to extend her dates for treatment due to the recent fall and her missing so many therapy appointments.                       PMH from evaluation:  Nely is a 54 y.o. female referred to outpatient Physical Therapy with a medical diagnosis of Left Tibial Plateau Fracture. Nely reports: the Left Knee has been unstable for years [place] [facility] The knee would give out on her frequently.  She fell twice in February 2024 causing pain in Left Knee and increased instability. She was seen by Dr Bro Wallace, Orthopedic Surgeon here at [facility] on 2/27/2024 and an MRI was ordered. Patient followed up with MD on 3/21/2024 for MRI results. MRI was performed that showed ACL tear, tear in the Medial Meniscus, tear in the Lateral Meniscus, Bone bruising of the Tibia with questionable posterior latera tibial plateau fracture. Patient was immobilized and placed on crutches for 4 weeks and then returned to the clinic to see Dr Wallace on 4/23/204. At that time she was given an injection in the Left knee and told to continue to rest the leg to allow continued healing. When she returned to MD on 5/23/2024 X Rays showed the tibial plateau fracture is healing. MD ordered Outpatient Physical Therapy and patient is here today for the Evaluation.  She reports she still has pain in the Left knee but it varies from day to day depending upon her activity level.    Patient presents with decreased Range of Motion in the Left Knee with Flexion more limited than extension. Patient has decreased Quad and Hamstring strength in the Left Lower Extremity. She is also unable to perform unsupported single leg stance on the Left Lower Extremity due to pain and Left Knee instability. Pain is constant and worsens with prolonged sitting, standing and walking.    MRI showed damage to ACL, Medial Meniscus, Lateral Meniscus, and Fracture of the  Tibial Plateau. Patient has significant pain in Left Knee at all times that worsens with activity. Her Left Knee is unstable with standing and walking and especially with walking down stairs. Feel she may have suffered an ACL injury back in [facility] and this is what made the knee so unstable for so many years. The knee gave out twice back in February which is what cased the Tibial Plateau fracture. Unsure if the Meniscus tears are new or old but some of her pain inside the knee seems to be generated from the area of the Medial and Lateral Meniscus. Feel the ACL tear is contributing to her knee instability.  During ambulation, she has decreased stance time and analgic gait on the Left.   Patient will require Physical Therapy Intervention to address all deficits and work toward completion of all goals set. Therapist will refer patient back to MD upon completion of Therapy for further assessment and possible MRI if pain and dysfunction persist.     Nely Is progressing well towards her goals.   Patient prognosis is Fair.       Patient's spiritual, cultural and educational needs considered and pt agreeable to plan of care and goals.     Anticipated Barriers for therapy:  damage to ACL, Medial Meniscus, Lateral Meniscus, and Fracture of the Tibial Plateau; Left Knee is very unstable     Goals:  Short Term Goals: 4 weeks   1. Independent with Home Exercise Program - Goal Met   2. Increase Left Knee Range of Motion to 0 Degrees to 120 Degrees - Goal Not Met   3. Increase Left Knee Strength to grossly 4+/5  - Goal Not Met   4. Patient will ambulate 500 feet with Normal Gait pattern with complaints of pain Less than or Equal to 4/10.   - Goal Not Met      Long Term Goals: 8 weeks   1. Patient will increase Left Knee Strength to grossly 5/5  - Goal Not Met   2. Patient will ambulate 1000+ feet with complaints of pain Less than or Equal to 3/10.  - Goal Not Met   3. Patient will perform stair negotiation with use of  handrails and reciprocal gait.  - Goal Not Met     Reasons for Recertification of Therapy: Therapist is performing an Updated Plan of Care today to extend her dates for treatment due to the recent fall and her missing so many therapy appointments. Patient will continue to benefit from skilled outpatient physical therapy to address the deficits listed in the problem list box on initial evaluation, provide pt/family education and to maximize pt's level of independence in the home and community environment.       Plan     Updated Plan of care Certification: 7/18/2024 to 9/13/2024.     Outpatient Physical Therapy 2 times weekly for 8 weeks to include the following interventions: 95953 [aquatics], 04512 [therapeutic exercise], 51616 [neuromuscular re-education], 83806 [gait training], 32111 [manual therapy], 10369 [therapeutic activities], and 02879 [unattended electrical stimulation]      KARTHIK SHAVER, PT, DPT   7/18/2024

## 2024-07-18 NOTE — PROGRESS NOTES
CC:  Knee pain    54 y.o. Female returns to clinic for a follow up visit regarding left knee tibial plateau fracture. She is better but has pain on the days that she is more active.     She had an injury to her right knee on vacation. It is getting better now. She has started a swimming program.       Past Medical History:   Diagnosis Date    Anxiety     Fibromyalgia     Headache(784.0)     Loss of balance     Memory loss      Past Surgical History:   Procedure Laterality Date    DILATION AND CURETTAGE OF UTERUS      GASTRIC BYPASS  2005    HERNIA REPAIR      HYSTERECTOMY      spinal tap      TONSILLECTOMY, ADENOIDECTOMY           PHYSICAL EXAMINATION:  There were no vitals taken for this visit.  General    Constitutional: She is oriented to person, place, and time. She appears well-developed and well-nourished.   HENT:   Head: Normocephalic and atraumatic.   Nose: Nose normal.   Eyes: EOM are normal. Pupils are equal, round, and reactive to light.   Cardiovascular:  Normal rate and intact distal pulses.            Pulmonary/Chest: Effort normal. No respiratory distress. She exhibits no tenderness.   Abdominal: Soft. She exhibits no distension. There is no abdominal tenderness.   Neurological: She is alert and oriented to person, place, and time. She has normal reflexes.   Psychiatric: She has a normal mood and affect. Her behavior is normal. Judgment and thought content normal.           Right Knee Exam     Inspection   Swelling: present  Deformity: absent    Tenderness   The patient is tender to palpation of the medial retinaculum and medial joint line.    Crepitus   The patient has crepitus of the medial joint line and medial plica.    Range of Motion   Flexion:  abnormal     Tests   Meniscus   Carlito:  Medial - positive   Ligament Examination   Lachman: normal (-1 to 2mm)   MCL - Valgus: normal (0 to 2mm)  LCL - Varus: normal  Dial Test at 30 degrees: normal (< 5 degrees)  Posterolateral Corner: unstable  (>15 degrees difference)  Patella   Patellar apprehension: negative  Patellar Grind: positive    Other   Sensation: normal    Comments:  Pain with Thessaly Maneuver    Left Knee Exam   Left knee exam is normal.    Inspection   Swelling: present  Effusion: present  Deformity: absent    Tenderness   The patient tender to palpation of the medial joint line.    Crepitus   The patient has crepitus of the medial joint line.    Range of Motion   Extension:  abnormal   Flexion:  abnormal     Tests   Meniscus   Carlito:  Medial - positive     Other   Meniscal Cyst: present  Popliteal (Baker's) Cyst: absent    Comments:  Pain with Thessaly Maneuver.     Muscle Strength   Right Lower Extremity   Quadriceps:  5/5   Hamstrin/5     Reflexes     Right Side   Achilles:  2+  Quadriceps:  2+    Vascular Exam     Right Pulses  Dorsalis Pedis:      2+  Posterior Tibial:      2+            IMAGING:  X-Ray Knee Complete 4 Or More Views Bilat    Result Date: 2024  See Procedure Notes for results. IMPRESSION: Please see Ortho procedure notes for report.  This procedure was auto-finalized by: Virtual Radiologist     Five views of the knee were obtained today evaluating AP lateral and sunrise of the right and left knee.  This demonstrates the presence of mild-to-moderate degenerative changes in the patellofemoral compartment well-preserved mediolateral compartments with slight valgus alignment  ASSESSMENT:      ICD-10-CM ICD-9-CM   1. Closed fracture of posterior aspect of left tibial plateau with routine healing, subsequent encounter  S82.142D V54.16   2. Right knee pain, unspecified chronicity  M25.561 719.46       PLAN:     -Findings and treatment options were discussed with the patient  -All questions answered  Natural history and expected course discussed. Questions answered.  Educational materials distributed.  Rest, ice, compression, and elevation (RICE) therapy.  Will set up with visco injections of left knee.  He has not  responded adequately with a left knee steroid injection.      There are no Patient Instructions on file for this visit.      No orders of the defined types were placed in this encounter.        Procedures

## 2024-07-29 ENCOUNTER — CLINICAL SUPPORT (OUTPATIENT)
Dept: REHABILITATION | Facility: HOSPITAL | Age: 54
End: 2024-07-29
Payer: COMMERCIAL

## 2024-07-29 DIAGNOSIS — S82.142D CLOSED FRACTURE OF LEFT TIBIAL PLATEAU WITH ROUTINE HEALING: Primary | ICD-10-CM

## 2024-07-29 DIAGNOSIS — R29.898 WEAKNESS OF LEFT LEG: ICD-10-CM

## 2024-07-29 PROCEDURE — 97530 THERAPEUTIC ACTIVITIES: CPT | Mod: CQ

## 2024-07-29 PROCEDURE — 97110 THERAPEUTIC EXERCISES: CPT | Mod: CQ

## 2024-07-29 NOTE — PROGRESS NOTES
"[facility]   Physical Therapy Treatment Note      Name: Nely Scott  Clinic Number: [id number]  Visit Date: 7/29/2024  Therapy Diagnosis: Left Tibial Plateau Fracture   Physician: Bro Wallace MD     Physician Orders: PT Eval and Treat   Medical Diagnosis from Referral: Left Tibial Plateau Fracture   Evaluation Date: 6/5/2024  Authorization Period Expiration: 5/23/2025  Plan of Care Expiration: 9/13/2024   Visit # / Visits authorized: 9/ 60 (Hard Max)   PTA Visit #: 1/5   FOTO: 40/100     Precautions: Standard      Time In: 4:03 pm  Time Out: 4:41 pm   Total Appointment Time (timed & untimed codes): 38 minutes     Subjective     Patient reports: "I don't feel the best today. I rode in a car yesterday for 8 hours."  She  has been  compliant with home exercise program.  Response to previous treatment: soreness noted  Functional change: n/a    Pain: 1-2/10 Right Knee; 8/10 Left Knee  Location: bilateral knees      Objective      Objective Measures updated at progress report unless specified.     Treatment     Nely received the treatments listed below:      therapeutic exercises to develop strength, endurance, ROM, and flexibility for 23 minutes including:    Bike x 5 minutes   SB 3 x 20 second hold   Hamstring Stretch on Stairs 4 x 20 second hold   Knee Flexion Stretch on Stairs 4 x 20 second hold     Supine :   Bilateral Quad Sets x 20  L Heel Slides 5 sec hold x 20  Bilateral Short Arc Quads x 20   Bilateral Straight Leg Raises x 20     Sitting :  Bilateral Hamstring Curls x 3 plates x 20   Ankle Pumps     manual therapy techniques: - were applied to the: - for 0 minutes, including:      neuromuscular re-education activities to improve: Balance, Coordination, Kinesthetic, Sense, and Proprioception for 0 minutes. The following activities were included:    Cable terminal knee extension 4 plates 30x5sh  3-way Hip (B) 20x each leg  Double Leg Press, rock into TERMINAL KNEE " "EXTENSION 6 plates 30x  Single Leg Press, rock into TERMINAL KNEE EXTENSION 4 plates 20x      SLR 20x5 sec hold  LAQ off EOB 20x5 sec hold  Single Leg Stance - Foam     therapeutic activities to improve functional performance for 15 minutes, including:    Standing :  Mini Squats x 10-- left knee bucked so withheld after performing 2 reps  Heel Raises x 30 off step--not today  Marching at rail 2 x 10   Hip Abduction at rail 2 x 10   Hamstring curls at rail 2 x 10   Lateral Step Downs with 2" step x 10 -- not today  Patient received Game Ready x 0 Minutes to Left Knee  Patient received Cold Pack x 0 minutes to Right Knee    Patient Education and Home Exercises       Education provided:    - basic knee home exercise program handout with education on 6/11/2024    Written Home Exercises Provided: Patient instructed to cont prior HEP. Exercises were reviewed and Nely was able to demonstrate them prior to the end of the session.  Nely demonstrated good  understanding of the education provided. See Electronic Medical Record under Patient Instructions for exercises provided during therapy sessions    Assessment       Pt tolerated her PT session fair with c/o left knee pain before,during, and after as 8/10. Her left knee buckled during mini squats so withheld the remaining. She was fearful of her right knee starting to wear down on her and questions if she needs another MRI on both knees at this time. She reported she would just apply ice at home instead of having Game Ready today for L knee. No acute distress was noted at end of session.           PMH from evaluation:  Nely is a 54 y.o. female referred to outpatient Physical Therapy with a medical diagnosis of Left Tibial Plateau Fracture. Nely reports: the Left Knee has been unstable for years following a knee injury in highschool. The knee would give out on her frequently.  She fell twice in February 2024 causing pain in Left Knee and increased instability. " She was seen by Dr Bro Wallace, Orthopedic Surgeon here at [facility] on 2/27/2024 and an MRI was ordered. Patient followed up with MD on 3/21/2024 for MRI results. MRI was performed that showed ACL tear, tear in the Medial Meniscus, tear in the Lateral Meniscus, Bone bruising of the Tibia with questionable posterior latera tibial plateau fracture. Patient was immobilized and placed on crutches for 4 weeks and then returned to the clinic to see Dr Wallace on 4/23/204. At that time she was given an injection in the Left knee and told to continue to rest the leg to allow continued healing. When she returned to MD on 5/23/2024 X Rays showed the tibial plateau fracture is healing. MD ordered Outpatient Physical Therapy and patient is here today for the Evaluation.  She reports she still has pain in the Left knee but it varies from day to day depending upon her activity level.    Patient presents with decreased Range of Motion in the Left Knee with Flexion more limited than extension. Patient has decreased Quad and Hamstring strength in the Left Lower Extremity. She is also unable to perform unsupported single leg stance on the Left Lower Extremity due to pain and Left Knee instability. Pain is constant and worsens with prolonged sitting, standing and walking.    MRI showed damage to ACL, Medial Meniscus, Lateral Meniscus, and Fracture of the Tibial Plateau. Patient has significant pain in Left Knee at all times that worsens with activity. Her Left Knee is unstable with standing and walking and especially with walking down stairs. Feel she may have suffered an ACL injury back in [facility] and this is what made the knee so unstable for so many years. The knee gave out twice back in February which is what cased the Tibial Plateau fracture. Unsure if the Meniscus tears are new or old but some of her pain inside the knee seems to be generated from the area of the Medial and Lateral Meniscus. Feel the ACL tear is contributing  to her knee instability.  During ambulation, she has decreased stance time and analgic gait on the Left.   Patient will require Physical Therapy Intervention to address all deficits and work toward completion of all goals set. Therapist will refer patient back to MD upon completion of Therapy for further assessment and possible MRI if pain and dysfunction persist.     Nely Is progressing well towards her goals.   Patient prognosis is Fair.     Patient will continue to benefit from skilled outpatient physical therapy to address the deficits listed in the problem list box on initial evaluation, provide pt/family education and to maximize pt's level of independence in the home and community environment.     Patient's spiritual, cultural and educational needs considered and pt agreeable to plan of care and goals.     Anticipated Barriers for therapy:  damage to ACL, Medial Meniscus, Lateral Meniscus, and Fracture of the Tibial Plateau; Left Knee is very unstable     Goals:  Short Term Goals: 4 weeks   1. Independent with Home Exercise Program - Goal Met   2. Increase Left Knee Range of Motion to 0 Degrees to 120 Degrees - Goal Not Met   3. Increase Left Knee Strength to grossly 4+/5  - Goal Not Met   4. Patient will ambulate 500 feet with Normal Gait pattern with complaints of pain Less than or Equal to 4/10.   - Goal Not Met      Long Term Goals: 8 weeks   1. Patient will increase Left Knee Strength to grossly 5/5  - Goal Not Met   2. Patient will ambulate 1000+ feet with complaints of pain Less than or Equal to 3/10.  - Goal Not Met   3. Patient will perform stair negotiation with use of handrails and reciprocal gait.  - Goal Not Met     Plan     Updated Plan of care Certification: 7/18/2024 to 9/13/2024.     Outpatient Physical Therapy 2 times weekly for 8 weeks to include the following interventions: 24831 [aquatics], 54717 [therapeutic exercise], 15763 [neuromuscular re-education], 63172 [gait training], 36368  [manual therapy], 23271 [therapeutic activities], and 53917 [unattended electrical stimulation]      Monique Concepcion, PTA  7/29/2024

## 2024-09-24 ENCOUNTER — OFFICE VISIT (OUTPATIENT)
Dept: FAMILY MEDICINE | Facility: CLINIC | Age: 54
End: 2024-09-24
Payer: COMMERCIAL

## 2024-09-24 VITALS
HEIGHT: 60 IN | HEART RATE: 75 BPM | TEMPERATURE: 98 F | WEIGHT: 249.19 LBS | BODY MASS INDEX: 48.92 KG/M2 | DIASTOLIC BLOOD PRESSURE: 84 MMHG | SYSTOLIC BLOOD PRESSURE: 128 MMHG | OXYGEN SATURATION: 97 %

## 2024-09-24 DIAGNOSIS — R53.83 FATIGUE, UNSPECIFIED TYPE: Primary | ICD-10-CM

## 2024-09-24 DIAGNOSIS — Z79.899 HIGH RISK MEDICATION USE: ICD-10-CM

## 2024-09-24 LAB
25(OH)D3 SERPL-MCNC: 23.1 NG/ML
ALBUMIN SERPL BCP-MCNC: 3.6 G/DL (ref 3.5–5)
ALBUMIN/GLOB SERPL: 1.1 {RATIO}
ALP SERPL-CCNC: 101 U/L (ref 41–108)
ALT SERPL W P-5'-P-CCNC: 18 U/L (ref 13–56)
ANION GAP SERPL CALCULATED.3IONS-SCNC: 8 MMOL/L (ref 7–16)
AST SERPL W P-5'-P-CCNC: 11 U/L (ref 15–37)
BASOPHILS # BLD AUTO: 0.06 K/UL (ref 0–0.2)
BASOPHILS NFR BLD AUTO: 0.5 % (ref 0–1)
BILIRUB SERPL-MCNC: 0.7 MG/DL (ref ?–1.2)
BUN SERPL-MCNC: 10 MG/DL (ref 7–18)
BUN/CREAT SERPL: 16 (ref 6–20)
CALCIUM SERPL-MCNC: 8.3 MG/DL (ref 8.5–10.1)
CHLORIDE SERPL-SCNC: 107 MMOL/L (ref 98–107)
CHOLEST SERPL-MCNC: 165 MG/DL (ref 0–200)
CHOLEST/HDLC SERPL: 1.7 {RATIO}
CO2 SERPL-SCNC: 28 MMOL/L (ref 21–32)
CREAT SERPL-MCNC: 0.61 MG/DL (ref 0.55–1.02)
DIFFERENTIAL METHOD BLD: ABNORMAL
EGFR (NO RACE VARIABLE) (RUSH/TITUS): 108 ML/MIN/1.73M2
EOSINOPHIL # BLD AUTO: 0.1 K/UL (ref 0–0.5)
EOSINOPHIL NFR BLD AUTO: 0.9 % (ref 1–4)
ERYTHROCYTE [DISTWIDTH] IN BLOOD BY AUTOMATED COUNT: 14 % (ref 11.5–14.5)
EST. AVERAGE GLUCOSE BLD GHB EST-MCNC: 128 MG/DL
GLOBULIN SER-MCNC: 3.2 G/DL (ref 2–4)
GLUCOSE SERPL-MCNC: 81 MG/DL (ref 74–106)
HBA1C MFR BLD HPLC: 6.1 % (ref 4.5–6.6)
HCT VFR BLD AUTO: 38.8 % (ref 38–47)
HDLC SERPL-MCNC: 95 MG/DL (ref 40–60)
HGB BLD-MCNC: 12.3 G/DL (ref 12–16)
IMM GRANULOCYTES # BLD AUTO: 0.06 K/UL (ref 0–0.04)
IMM GRANULOCYTES NFR BLD: 0.5 % (ref 0–0.4)
LDLC SERPL CALC-MCNC: 57 MG/DL
LYMPHOCYTES # BLD AUTO: 2.31 K/UL (ref 1–4.8)
LYMPHOCYTES NFR BLD AUTO: 20.3 % (ref 27–41)
MCH RBC QN AUTO: 28 PG (ref 27–31)
MCHC RBC AUTO-ENTMCNC: 31.7 G/DL (ref 32–36)
MCV RBC AUTO: 88.2 FL (ref 80–96)
MONOCYTES # BLD AUTO: 0.67 K/UL (ref 0–0.8)
MONOCYTES NFR BLD AUTO: 5.9 % (ref 2–6)
MPC BLD CALC-MCNC: 11.8 FL (ref 9.4–12.4)
NEUTROPHILS # BLD AUTO: 8.17 K/UL (ref 1.8–7.7)
NEUTROPHILS NFR BLD AUTO: 71.9 % (ref 53–65)
NONHDLC SERPL-MCNC: 70 MG/DL
NRBC # BLD AUTO: 0 X10E3/UL
NRBC, AUTO (.00): 0 %
PLATELET # BLD AUTO: 251 K/UL (ref 150–400)
POTASSIUM SERPL-SCNC: 4.3 MMOL/L (ref 3.5–5.1)
PROT SERPL-MCNC: 6.8 G/DL (ref 6.4–8.2)
RBC # BLD AUTO: 4.4 M/UL (ref 4.2–5.4)
SODIUM SERPL-SCNC: 139 MMOL/L (ref 136–145)
T4 FREE SERPL-MCNC: 0.89 NG/DL (ref 0.76–1.46)
TRIGL SERPL-MCNC: 64 MG/DL (ref 35–150)
TSH SERPL DL<=0.005 MIU/L-ACNC: 1.46 UIU/ML (ref 0.36–3.74)
VIT B12 SERPL-MCNC: 356 PG/ML (ref 193–986)
VLDLC SERPL-MCNC: 13 MG/DL
WBC # BLD AUTO: 11.37 K/UL (ref 4.5–11)

## 2024-09-24 PROCEDURE — 1159F MED LIST DOCD IN RCRD: CPT | Mod: ,,, | Performed by: NURSE PRACTITIONER

## 2024-09-24 PROCEDURE — 3008F BODY MASS INDEX DOCD: CPT | Mod: ,,, | Performed by: NURSE PRACTITIONER

## 2024-09-24 PROCEDURE — 3079F DIAST BP 80-89 MM HG: CPT | Mod: ,,, | Performed by: NURSE PRACTITIONER

## 2024-09-24 PROCEDURE — 84439 ASSAY OF FREE THYROXINE: CPT | Mod: ,,, | Performed by: CLINICAL MEDICAL LABORATORY

## 2024-09-24 PROCEDURE — 83036 HEMOGLOBIN GLYCOSYLATED A1C: CPT | Mod: ,,, | Performed by: CLINICAL MEDICAL LABORATORY

## 2024-09-24 PROCEDURE — 3074F SYST BP LT 130 MM HG: CPT | Mod: ,,, | Performed by: NURSE PRACTITIONER

## 2024-09-24 PROCEDURE — 82607 VITAMIN B-12: CPT | Mod: ,,, | Performed by: CLINICAL MEDICAL LABORATORY

## 2024-09-24 PROCEDURE — 80061 LIPID PANEL: CPT | Mod: ,,, | Performed by: CLINICAL MEDICAL LABORATORY

## 2024-09-24 PROCEDURE — 99213 OFFICE O/P EST LOW 20 MIN: CPT | Mod: ,,, | Performed by: NURSE PRACTITIONER

## 2024-09-24 PROCEDURE — 80050 GENERAL HEALTH PANEL: CPT | Mod: ,,, | Performed by: CLINICAL MEDICAL LABORATORY

## 2024-09-24 PROCEDURE — 82306 VITAMIN D 25 HYDROXY: CPT | Mod: ,,, | Performed by: CLINICAL MEDICAL LABORATORY

## 2024-09-24 PROCEDURE — 1160F RVW MEDS BY RX/DR IN RCRD: CPT | Mod: ,,, | Performed by: NURSE PRACTITIONER

## 2024-09-24 NOTE — PROGRESS NOTES
[facility]  Chief Complaint      Chief Complaint   Patient presents with    Anemia     Patient presents to clinic for anemia. S/S: SOB, chest tightness, and fatigue. PMHx of PCOS and Iron Deficiency Anemia.     Diabetes     Patient has expressed concern about being a diabetic and would like to be tested to be sure. S/S: increased hunger and urination at night.        History of Present Illness      Nely Scott is a 54 y.o. female with chronic conditions of Chronic headaches, Anemia, Fibromyalgia, Osteoarthritis, and Morbid obesity. She presents today to follow-up on fatigue, hx of anemia, and blood glucose level. The pt reports SOB and chest tightness on exertion, denies chest pain or  She also reports increase hunger and increased urination at night, denies dysuria    Anemia  Presents for follow-up visit. Symptoms include malaise/fatigue. There has been no abdominal pain or fever. Signs of blood loss that are not present include hematemesis, menorrhagia and vaginal bleeding.      Past Medical History:  Past Medical History:   Diagnosis Date    Anxiety     Fibromyalgia     Headache(784.0)     Loss of balance     Memory loss        Past Surgical History:   has a past surgical history that includes Dilation and curettage of uterus; Hysterectomy; TONSILLECTOMY, ADENOIDECTOMY; spinal tap; Gastric bypass (2005); and Hernia repair.    Social History:  Social History     Tobacco Use    Smoking status: Never     Passive exposure: Never    Smokeless tobacco: Never   Substance Use Topics    Alcohol use: No    Drug use: Never       I personally reviewed all past medical, surgical, and social.     Review of Systems   Constitutional:  Positive for malaise/fatigue. Negative for fever and unexpected weight change.   Respiratory:  Negative for cough and shortness of breath.    Cardiovascular:  Negative for leg swelling.   Gastrointestinal:  Negative for abdominal pain, constipation,  diarrhea, hematemesis and nausea.   Genitourinary:  Negative for difficulty urinating, hematuria, menorrhagia and vaginal bleeding.   Musculoskeletal:  Positive for arthralgias and myalgias.   Skin:  Negative for color change and rash.   Neurological:  Negative for syncope.   Psychiatric/Behavioral:  Negative for dysphoric mood.       Medications:  Outpatient Encounter Medications as of 9/24/2024   Medication Sig Dispense Refill    multivitamin with minerals tablet Take 1 tablet by mouth once daily.      [DISCONTINUED] celecoxib (CELEBREX) 200 MG capsule Take 1 capsule (200 mg total) by mouth 2 (two) times daily. 60 capsule 2    [DISCONTINUED] cholecalciferol, vitamin D3, 125 mcg (5,000 unit) Tab Take 5,000 Units by mouth.       No facility-administered encounter medications on file as of 9/24/2024.       Allergies:  Review of patient's allergies indicates:   Allergen Reactions    Codeine Hives, Itching and Rash       Health Maintenance:    There is no immunization history on file for this patient.     Health Maintenance   Topic Date Due    Hepatitis C Screening  Never done    TETANUS VACCINE  Never done    Mammogram  Never done    Lipid Panel  06/30/2010    Colorectal Cancer Screening  Never done    Shingles Vaccine (1 of 2) Never done        Physical Exam      Vital Signs  Temp: 97.9 °F (36.6 °C)  Pulse: 75  SpO2: 97 %  BP: 128/84  BP Location: Left arm  Patient Position: Sitting  Pain Score:   5  Pain Loc: Generalized  Height and Weight  Height: 5' (152.4 cm)  Weight: 113 kg (249 lb 3.2 oz)  BSA (Calculated - sq m): 2.19 sq meters  BMI (Calculated): 48.7  Weight in (lb) to have BMI = 25: 127.7]    Physical Exam  Constitutional:       General: She is not in acute distress.     Appearance: Normal appearance. She is obese.   HENT:      Head: Normocephalic.      Mouth/Throat:      Mouth: Mucous membranes are moist.   Cardiovascular:      Rate and Rhythm: Normal rate and regular rhythm.      Pulses: Normal  "pulses.      Heart sounds: Normal heart sounds.   Pulmonary:      Effort: Pulmonary effort is normal. No respiratory distress.      Breath sounds: Normal breath sounds.   Abdominal:      Palpations: Abdomen is soft.      Tenderness: There is no abdominal tenderness.   Musculoskeletal:         General: Normal range of motion.   Skin:     General: Skin is warm and dry.   Neurological:      Mental Status: She is alert and oriented to person, place, and time.   Psychiatric:         Mood and Affect: Mood normal.         Behavior: Behavior normal.        Laboratory:  CBC:        LIPIDS:        TSH:        A1C:        Lab Results   Component Value Date    GLU 84 10/11/2011     10/11/2011    K 4.3 10/11/2011     10/11/2011    CO2 23 10/11/2011    BUN 14 10/11/2011    CREATININE 0.6 10/11/2011    CALCIUM 8.9 10/11/2011    PROT 7.0 10/11/2011    ALBUMIN 3.6 10/11/2011    BILITOT 0.4 10/11/2011    ALKPHOS 87 10/11/2011    AST 16 10/11/2011    ALT 15 10/11/2011    ANIONGAP 16 10/11/2011    ESTGFRAFRICA >60 10/11/2011    EGFRNONAA >60 10/11/2011       Lab Results   Component Value Date    WBC 8.24 11/07/2005    RBC 4.66 11/07/2005    HGB 12.9 11/07/2005    HCT 40.5 11/07/2005    MCV 86.9 11/07/2005    RDW 15.0 (H) 11/07/2005     11/07/2005        Lab Results   Component Value Date    CHOL 177 06/30/2005    TRIG 82 06/30/2005    HDL 61.0 06/30/2005    LDLCALC 99.6 06/30/2005    TOTALCHOLEST 2.9 06/30/2005       Lab Results   Component Value Date    TSH 1.13 10/11/2011       Lab Results   Component Value Date    HGBA1C 5.6 06/30/2005        No components found for: "VITAMIND"    No results found for: "PSA"    Point Of Care Testing:  Nitrite, UA   Date Value Ref Range Status   10/06/2005 NEG Negative Final     Urobilinogen, UA   Date Value Ref Range Status   10/06/2005 0.2 0 - 1 EU/DL Final     pH, UA   Date Value Ref Range Status   10/06/2005 6.5 4.5 - 8.0 Final     Specific Gravity, UA   Date Value Ref Range " Status   10/06/2005 1.009 1.003 - 1.030 Final     Ketones, UA   Date Value Ref Range Status   10/06/2005 40 (A) Negative mg/dl Final       Lab Results   Component Value Date    CRPQXQH6CY Negative 06/01/2022    RAPFLUA Negative 05/18/2022    RAPFLUB Negative 05/18/2022         Assessment/Plan     1. Fatigue, unspecified type  -     CBC Auto Differential; Future; Expected date: 09/24/2024  -     Hemoglobin A1C; Future; Expected date: 09/24/2024  -     Comprehensive Metabolic Panel; Future; Expected date: 09/24/2024  -     Vitamin D; Future; Expected date: 09/24/2024  -     Vitamin B12; Future; Expected date: 09/24/2024  -     Thyroid Panel; Future; Expected date: 09/24/2024    2. High risk medication use  -     Lipid Panel; Future; Expected date: 09/24/2024       Discussed alternating activity with rest. Labs are pending, will notify pt of results.      Return to clinic in 3 months and as needed.    Questions answered to desired level of satisfaction    Verbalized understanding to all information and instructions provided      JULIA Ramires-[facility]

## 2024-09-25 DIAGNOSIS — E55.9 VITAMIN D DEFICIENCY: Primary | ICD-10-CM

## 2024-09-25 RX ORDER — ERGOCALCIFEROL 1.25 MG/1
50000 CAPSULE ORAL
Qty: 8 CAPSULE | Refills: 0 | Status: SHIPPED | OUTPATIENT
Start: 2024-09-25

## 2024-10-11 ENCOUNTER — TELEPHONE (OUTPATIENT)
Dept: FAMILY MEDICINE | Facility: CLINIC | Age: 54
End: 2024-10-11
Payer: COMMERCIAL

## 2024-10-11 RX ORDER — ALBUTEROL SULFATE 90 UG/1
2 INHALANT RESPIRATORY (INHALATION) EVERY 6 HOURS PRN
COMMUNITY

## 2024-10-11 RX ORDER — BENZONATATE 100 MG/1
1 CAPSULE ORAL 3 TIMES DAILY PRN
COMMUNITY

## 2024-10-11 NOTE — TELEPHONE ENCOUNTER
Received T.O from Mala Live NP for Benzonatate 100 mg PO TID PRN for Cough and Proventil HFA 90 mcg/actuation inhaler 2 puffs Q6H PRN for wheezing. Gave T.O. with Pharmacist Amina at [facility] on 10/11/2024. Notified patient medication has been called into pharmacy for pickup.

## 2024-12-31 ENCOUNTER — OFFICE VISIT (OUTPATIENT)
Dept: FAMILY MEDICINE | Facility: CLINIC | Age: 54
End: 2024-12-31
Payer: COMMERCIAL

## 2024-12-31 VITALS
TEMPERATURE: 98 F | WEIGHT: 249.63 LBS | OXYGEN SATURATION: 97 % | DIASTOLIC BLOOD PRESSURE: 88 MMHG | SYSTOLIC BLOOD PRESSURE: 133 MMHG | HEIGHT: 60 IN | BODY MASS INDEX: 49.01 KG/M2 | HEART RATE: 64 BPM

## 2024-12-31 DIAGNOSIS — Z13.220 SCREENING FOR LIPOID DISORDERS: ICD-10-CM

## 2024-12-31 DIAGNOSIS — Z00.00 ENCOUNTER FOR ANNUAL GENERAL MEDICAL EXAMINATION WITHOUT ABNORMAL FINDINGS IN ADULT: Primary | ICD-10-CM

## 2024-12-31 DIAGNOSIS — Z13.1 SCREENING FOR DIABETES MELLITUS: ICD-10-CM

## 2024-12-31 LAB
CHOLEST SERPL-MCNC: 196 MG/DL
CHOLEST/HDLC SERPL: 2.2 {RATIO}
GLUCOSE SERPL-MCNC: 106 MG/DL (ref 70–100)
HDLC SERPL-MCNC: 89 MG/DL (ref 35–60)
LDLC SERPL CALC-MCNC: 94 MG/DL
LDLC/HDLC SERPL: 1.1 {RATIO}
NONHDLC SERPL-MCNC: 107 MG/DL
TRIGL SERPL-MCNC: 63 MG/DL (ref 37–140)
VLDLC SERPL-MCNC: 13 MG/DL

## 2024-12-31 PROCEDURE — 3079F DIAST BP 80-89 MM HG: CPT | Mod: ,,, | Performed by: NURSE PRACTITIONER

## 2024-12-31 PROCEDURE — 1160F RVW MEDS BY RX/DR IN RCRD: CPT | Mod: ,,, | Performed by: NURSE PRACTITIONER

## 2024-12-31 PROCEDURE — 3075F SYST BP GE 130 - 139MM HG: CPT | Mod: ,,, | Performed by: NURSE PRACTITIONER

## 2024-12-31 PROCEDURE — 1159F MED LIST DOCD IN RCRD: CPT | Mod: ,,, | Performed by: NURSE PRACTITIONER

## 2024-12-31 PROCEDURE — 82947 ASSAY GLUCOSE BLOOD QUANT: CPT | Mod: ,,, | Performed by: CLINICAL MEDICAL LABORATORY

## 2024-12-31 PROCEDURE — 80061 LIPID PANEL: CPT | Mod: ,,, | Performed by: CLINICAL MEDICAL LABORATORY

## 2024-12-31 PROCEDURE — 3008F BODY MASS INDEX DOCD: CPT | Mod: ,,, | Performed by: NURSE PRACTITIONER

## 2024-12-31 PROCEDURE — 99396 PREV VISIT EST AGE 40-64: CPT | Mod: ,,, | Performed by: NURSE PRACTITIONER

## 2025-01-02 ENCOUNTER — PATIENT OUTREACH (OUTPATIENT)
Facility: HOSPITAL | Age: 55
End: 2025-01-02
Payer: COMMERCIAL

## 2025-01-02 NOTE — PROGRESS NOTES
Population Health Chart Review & Patient Outreach Details      Further Action Needed If Patient Returns Outreach:            Updates Requested / Reviewed:     []  Care Everywhere    []     []  External Sources ([facility], [facility], DIS, etc.)    [] [facility]   [] [facility]   [] Other:    []  Care Team Updated   []  Removed  or Duplicate Orders   []  Immunization Reconciliation Completed / Queried    [] [place]   [] [place]   [] [place]   [] [place]      Health Maintenance Topics Addressed and Outreach Outcomes / Actions Taken:             Breast Cancer Screening []  Mammogram Order Placed    []  Mammogram Screening Scheduled    []  External Records Requested & Care Team Updated if Applicable    []  External Records Uploaded & Care Team Updated if Applicable    []  Pt Declined Scheduling Mammogram    []  Pt Will Schedule with External Provider / Order Routed & Care Team Updated if Applicable              Cervical Cancer Screening []  Pap Smear Scheduled in Primary Care or OBGYN    []  External Records Requested & Care Team Updated if Applicable       []  External Records Uploaded, Care Team Updated, & History Updated if Applicable    []  Patient Declined Scheduling Pap Smear    []  Patient Will Schedule with External Provider & Care Team Updated if Applicable                  Colorectal Cancer Screening []  Colonoscopy Case Request / Referral / Home Test Order Placed    []  External Records Requested & Care Team Updated if Applicable    []  External Records Uploaded, Care Team Updated, & History Updated if Applicable    []  Patient Declined Completing Colon Cancer Screening    []  Patient Will Schedule with External Provider & Care Team Updated if Applicable    []  Fit Kit Mailed (add the SmartPhrase under additional notes)    []  Reminded Patient to Complete Home Test                Diabetic Eye Exam []  Eye Exam Screening Order Placed    []  Eye Camera Scheduled or Optometry/Ophthalmology Referral  Placed    []  External Records Requested & Care Team Updated if Applicable    []  External Records Uploaded, Care Team Updated, & History Updated if Applicable    []  Patient Declined Scheduling Eye Exam    []  Patient Will Schedule with External Provider & Care Team Updated if Applicable             Blood Pressure Control []  Primary Care Follow Up Visit Scheduled     []  Remote Blood Pressure Reading Captured    []  Patient Declined Remote Reading or Scheduling Appt - Escalated to PCP    []  Patient Will Call Back or Send Portal Message with Reading                 HbA1c & Other Labs []  Overdue Lab(s) Ordered    []  Overdue Lab(s) Scheduled    []  External Records Uploaded & Care Team Updated if Applicable    []  Primary Care Follow Up Visit Scheduled     []  Reminded Patient to Complete A1c Home Test    []  Patient Declined Scheduling Labs or Will Call Back to Schedule    []  Patient Will Schedule with External Provider / Order Routed, & Care Team Updated if Applicable           Primary Care Appointment []  Primary Care Appt Scheduled    []  Patient Declined Scheduling or Will Call Back to Schedule    []  Pt Established with External Provider, Updated Care Team, & Informed Pt to Notify Payor if Applicable           Medication Adherence /    Statin Use []  Primary Care Appointment Scheduled    []  Patient Reminded to  Prescription    []  Patient Declined, Provider Notified if Needed    []  Sent Provider Message to Review to Evaluate Pt for Statin, Add Exclusion Dx Codes, Document   Exclusion in Problem List, Change Statin Intensity Level to Moderate or High Intensity if Applicable                Osteoporosis Screening []  Dexa Order Placed    []  Dexa Appointment Scheduled    []  External Records Requested & Care Team Updated    []  External Records Uploaded, Care Team Updated, & History Updated if Applicable    []  Patient Declined Scheduling Dexa or Will Call Back to Schedule    []  Patient Will Schedule  with External Provider / Order Routed & Care Team Updated if Applicable       Additional Notes:.  Post visit Population Health review of encounter with date of service  12/31/24 with [facility]. Not  All required HY components in encounter.  Needs wellness plan message sent. Chart is opened  needs primary dx as z00.00 or z00.01. Also needs CPT for age 54.    Followup appt for: 12/31/25 HY

## 2025-01-03 PROBLEM — Z13.1 SCREENING FOR DIABETES MELLITUS: Status: ACTIVE | Noted: 2025-01-03

## 2025-01-03 PROBLEM — Z13.220 SCREENING FOR LIPOID DISORDERS: Status: ACTIVE | Noted: 2025-01-03

## 2025-01-03 PROBLEM — Z00.00 ENCOUNTER FOR ANNUAL GENERAL MEDICAL EXAMINATION WITHOUT ABNORMAL FINDINGS IN ADULT: Status: ACTIVE | Noted: 2025-01-03

## 2025-01-03 NOTE — PROGRESS NOTES
[facility]  Chief Complaint      Chief Complaint   Patient presents with    Healthy You     Patient presents to clinic for Healthy You with labs.     Health Maintenance     She is fasting and is not on any prescription medications.        History of Present Illness      Nely Scott is a 54 y.o. female  who presents today for a healthy You visit with labs.    HPI     Past Medical History:  Past Medical History:   Diagnosis Date    Anxiety     Fibromyalgia     Headache(784.0)     Loss of balance     Memory loss        Past Surgical History:   has a past surgical history that includes Dilation and curettage of uterus; Hysterectomy; TONSILLECTOMY, ADENOIDECTOMY; spinal tap; Gastric bypass (2005); and Hernia repair.    Social History:  Social History     Tobacco Use    Smoking status: Never     Passive exposure: Never    Smokeless tobacco: Never   Substance Use Topics    Alcohol use: No    Drug use: Never       I personally reviewed all past medical, surgical, and social.     Review of Systems   Constitutional:  Negative for fatigue, fever and unexpected weight change.   Respiratory:  Negative for cough and shortness of breath.    Cardiovascular:  Negative for leg swelling.   Gastrointestinal:  Negative for abdominal pain, constipation, diarrhea and nausea.   Genitourinary:  Negative for difficulty urinating.   Musculoskeletal:  Positive for arthralgias and myalgias.   Skin:  Negative for color change and rash.   Neurological:  Negative for dizziness and syncope.   Psychiatric/Behavioral:  Negative for dysphoric mood.         Medications:  Outpatient Encounter Medications as of 12/31/2024   Medication Sig Dispense Refill    multivitamin with minerals tablet Take 1 tablet by mouth once daily.      [DISCONTINUED] albuterol (PROVENTIL HFA) 90 mcg/actuation inhaler Inhale 2 puffs into the lungs every 6 (six) hours as needed for Wheezing.      [DISCONTINUED] benzonatate (TESSALON) 100 MG  capsule Take 1 capsule by mouth 3 (three) times daily as needed for Cough.      [DISCONTINUED] ergocalciferol (ERGOCALCIFEROL) 50,000 unit Cap Take 1 capsule (50,000 Units total) by mouth every 7 days. (Patient not taking: Reported on 12/31/2024) 8 capsule 0     No facility-administered encounter medications on file as of 12/31/2024.       Allergies:  Review of patient's allergies indicates:   Allergen Reactions    Codeine Hives, Itching and Rash       Health Maintenance:    There is no immunization history on file for this patient.     Health Maintenance   Topic Date Due    Hepatitis C Screening  Never done    HIV Screening  Never done    TETANUS VACCINE  Never done    Mammogram  Never done    Colorectal Cancer Screening  Never done    Shingles Vaccine (1 of 2) Never done    Pneumococcal Vaccines (Age 50+) (1 of 1 - PCV) Never done    Influenza Vaccine (1) 09/01/2024    COVID-19 Vaccine (1 - 2024-25 season) Never done    Hemoglobin A1c (Diabetic Prevention Screening)  09/24/2027    Lipid Panel  12/31/2029    RSV Vaccine (Age 60+ and Pregnant patients) (1 - 1-dose 75+ series) 03/28/2047        Physical Exam      Vital Signs  Temp: 97.9 °F (36.6 °C)  Temp Source: Oral  Pulse: 64  SpO2: 97 %  BP: 133/88  BP Location: Right arm  Patient Position: Sitting  Pain Score:   5  Pain Loc: Generalized (joints)  Height and Weight  Height: 5' (152.4 cm)  Weight: 113.2 kg (249 lb 9.6 oz)  BSA (Calculated - sq m): 2.19 sq meters  BMI (Calculated): 48.7  Weight in (lb) to have BMI = 25: 127.7]    Physical Exam  Constitutional:       General: She is not in acute distress.     Appearance: Normal appearance. She is obese.   HENT:      Head: Normocephalic.      Mouth/Throat:      Mouth: Mucous membranes are moist.   Cardiovascular:      Rate and Rhythm: Normal rate and regular rhythm.      Pulses: Normal pulses.      Heart sounds: Normal heart sounds.   Pulmonary:      Effort: Pulmonary effort is normal. No respiratory distress.       Breath sounds: Normal breath sounds.   Abdominal:      Palpations: Abdomen is soft.      Tenderness: There is no abdominal tenderness.   Musculoskeletal:         General: Normal range of motion.   Skin:     General: Skin is warm and dry.   Neurological:      Mental Status: She is alert and oriented to person, place, and time.   Psychiatric:         Mood and Affect: Mood normal.         Behavior: Behavior normal.          Laboratory:  CBC:  Recent Labs   Lab 09/24/24  1419   WBC 11.37 H   RBC 4.40   Hemoglobin 12.3   Hematocrit 38.8   Platelet Count 251   MCV 88.2   MCH 28.0   MCHC 31.7 L       LIPIDS:  Recent Labs   Lab 09/24/24  1419 12/31/24  0930   TSH 1.460  --    HDL Cholesterol 95 H 89 H   Cholesterol 165 196   Triglycerides 64 63   LDL Calculated 57 94   Cholesterol/HDL Ratio (Risk Factor) 1.7 2.2   Non-HDL 70 107       TSH:  Recent Labs   Lab 09/24/24  1419   TSH 1.460       A1C:  Recent Labs   Lab 09/24/24  1419   Hemoglobin A1C 6.1       Lab Results   Component Value Date    GLU 81 09/24/2024    GLU 84 10/11/2011     09/24/2024     10/11/2011    K 4.3 09/24/2024    K 4.3 10/11/2011     09/24/2024     10/11/2011    CO2 28 09/24/2024    CO2 23 10/11/2011    BUN 10 09/24/2024    BUN 14 10/11/2011    CREATININE 0.61 09/24/2024    CREATININE 0.6 10/11/2011    CALCIUM 8.3 (L) 09/24/2024    CALCIUM 8.9 10/11/2011    PROT 6.8 09/24/2024    PROT 7.0 10/11/2011    ALBUMIN 3.6 09/24/2024    ALBUMIN 3.6 10/11/2011    BILITOT 0.7 09/24/2024    BILITOT 0.4 10/11/2011    ALKPHOS 101 09/24/2024    ALKPHOS 87 10/11/2011    AST 11 (L) 09/24/2024    AST 16 10/11/2011    ALT 18 09/24/2024    ALT 15 10/11/2011    ANIONGAP 8 09/24/2024    ANIONGAP 16 10/11/2011    ESTGFRAFRICA >60 10/11/2011    EGFRNONAA >60 10/11/2011       Lab Results   Component Value Date    WBC 11.37 (H) 09/24/2024    WBC 8.24 11/07/2005    RBC 4.40 09/24/2024    RBC 4.66 11/07/2005    HGB 12.3 09/24/2024    HGB 12.9 11/07/2005     "HCT 38.8 09/24/2024    HCT 40.5 11/07/2005    MCV 88.2 09/24/2024    MCV 86.9 11/07/2005    RDW 14.0 09/24/2024    RDW 15.0 (H) 11/07/2005     09/24/2024     11/07/2005        Lab Results   Component Value Date    CHOL 196 12/31/2024    CHOL 177 06/30/2005    TRIG 63 12/31/2024    TRIG 82 06/30/2005    HDL 89 (H) 12/31/2024    HDL 61.0 06/30/2005    LDLCALC 94 12/31/2024    LDLCALC 99.6 06/30/2005    TOTALCHOLEST 2.9 06/30/2005       Lab Results   Component Value Date    TSH 1.460 09/24/2024    TSH 1.13 10/11/2011       Lab Results   Component Value Date    HGBA1C 6.1 09/24/2024    HGBA1C 5.6 06/30/2005    ESTIMATEDAVG 128 09/24/2024        No components found for: "VITAMIND"    No results found for: "PSA"    Point Of Care Testing:  Nitrite, UA   Date Value Ref Range Status   10/06/2005 NEG Negative Final     Urobilinogen, UA   Date Value Ref Range Status   10/06/2005 0.2 0 - 1 EU/DL Final     pH, UA   Date Value Ref Range Status   10/06/2005 6.5 4.5 - 8.0 Final     Specific Gravity, UA   Date Value Ref Range Status   10/06/2005 1.009 1.003 - 1.030 Final     Ketones, UA   Date Value Ref Range Status   10/06/2005 40 (A) Negative mg/dl Final       Lab Results   Component Value Date    [id number] Negative 06/01/2022    RAPFLUA Negative 05/18/2022    RAPFLUB Negative 05/18/2022         Assessment/Plan     1. Encounter for annual general medical examination without abnormal findings in adult    2. Screening for diabetes mellitus  -     Glucose, Fasting; Future; Expected date: 12/31/2024    3. Screening for lipoid disorders  -     Lipid Panel; Future; Expected date: 12/31/2024           Return to clinic in 3 months.    Questions answered to desired level of satisfaction    Verbalized understanding to all information and instructions provided      JULIA Ramires-[facility]    "

## 2025-03-25 ENCOUNTER — PATIENT OUTREACH (OUTPATIENT)
Facility: HOSPITAL | Age: 55
End: 2025-03-25
Payer: COMMERCIAL

## 2025-03-25 NOTE — PROGRESS NOTES
Population Health Chart Review & Patient Outreach Details  Per [facility] website, insurance is active and patient is enrolled in CM:  CM! Provider CM[facility]! Benefit Start Date CM[facility]! Benefit End Date Baseline Risk Track(s) Baseline Risk Level Current Risk Tracks(s) Current Risk Level   ZAKI GREER CNP 2025 Hypertension, Glucose Low Hypertension, Glucose Low     Further Action Needed If Patient Returns Outreach:            Updates Requested / Reviewed:     []  Care Everywhere    []     []  External Sources ([facility], [facility], DIS, etc.)    [] [facility]   [] [facility]   [] Other:    []  Care Team Updated   []  Removed  or Duplicate Orders   []  Immunization Reconciliation Completed / Queried    [] [place]   [] [place]   [] [place]   [] [place]      [facility] Topics Addressed and Outreach Outcomes / Actions Taken:             Breast Cancer Screening []  Mammogram Order Placed    []  Mammogram Screening Scheduled    []  External Records Requested & Care Team Updated if Applicable    []  External Records Uploaded & Care Team Updated if Applicable    []  Pt Declined Scheduling Mammogram    []  Pt Will Schedule with External Provider / Order Routed & Care Team Updated if Applicable              Cervical Cancer Screening []  Pap Smear Scheduled in Primary Care or OBGYN    []  External Records Requested & Care Team Updated if Applicable       []  External Records Uploaded, Care Team Updated, & History Updated if Applicable    []  Patient Declined Scheduling Pap Smear    []  Patient Will Schedule with External Provider & Care Team Updated if Applicable                  Colorectal Cancer Screening []  Colonoscopy Case Request / Referral / Home Test Order Placed    []  External Records Requested & Care Team Updated if Applicable    []  External Records Uploaded, Care Team Updated, & History Updated if Applicable    []  Patient Declined Completing Colon Cancer Screening    []  Patient Will  Schedule with External Provider & Care Team Updated if Applicable    []  Fit Kit Mailed (add the SmartPhrase under additional notes)    []  Reminded Patient to Complete Home Test                Diabetic Eye Exam []  Eye Exam Screening Order Placed    []  Eye Camera Scheduled or Optometry/Ophthalmology Referral Placed    []  External Records Requested & Care Team Updated if Applicable    []  External Records Uploaded, Care Team Updated, & History Updated if Applicable    []  Patient Declined Scheduling Eye Exam    []  Patient Will Schedule with External Provider & Care Team Updated if Applicable             Blood Pressure Control []  Primary Care Follow Up Visit Scheduled     []  Remote Blood Pressure Reading Captured    []  Patient Declined Remote Reading or Scheduling Appt - Escalated to PCP    []  Patient Will Call Back or Send Portal Message with Reading                 HbA1c & Other Labs []  Overdue Lab(s) Ordered    []  Overdue Lab(s) Scheduled    []  External Records Uploaded & Care Team Updated if Applicable    []  Primary Care Follow Up Visit Scheduled     []  Reminded Patient to Complete A1c Home Test    []  Patient Declined Scheduling Labs or Will Call Back to Schedule    []  Patient Will Schedule with External Provider / Order Routed, & Care Team Updated if Applicable           Primary Care Appointment []  Primary Care Appt Scheduled    []  Patient Declined Scheduling or Will Call Back to Schedule    []  Pt Established with External Provider, Updated Care Team, & Informed Pt to Notify Payor if Applicable           Medication Adherence /    Statin Use []  Primary Care Appointment Scheduled    []  Patient Reminded to  Prescription    []  Patient Declined, Provider Notified if Needed    []  Sent Provider Message to Review to Evaluate Pt for Statin, Add Exclusion Dx Codes, Document   Exclusion in Problem List, Change Statin Intensity Level to Moderate or High Intensity if Applicable                 Osteoporosis Screening []  Dexa Order Placed    []  Dexa Appointment Scheduled    []  External Records Requested & Care Team Updated    []  External Records Uploaded, Care Team Updated, & History Updated if Applicable    []  Patient Declined Scheduling Dexa or Will Call Back to Schedule    []  Patient Will Schedule with External Provider / Order Routed & Care Team Updated if Applicable       Additional Notes: